# Patient Record
Sex: FEMALE | Race: WHITE | NOT HISPANIC OR LATINO | Employment: OTHER | URBAN - METROPOLITAN AREA
[De-identification: names, ages, dates, MRNs, and addresses within clinical notes are randomized per-mention and may not be internally consistent; named-entity substitution may affect disease eponyms.]

---

## 2017-01-09 ENCOUNTER — ANTI-COAG VISIT (OUTPATIENT)
Dept: CARDIOLOGY | Facility: CLINIC | Age: 82
End: 2017-01-09
Payer: MEDICARE

## 2017-01-09 ENCOUNTER — OFFICE VISIT (OUTPATIENT)
Dept: PODIATRY | Facility: CLINIC | Age: 82
End: 2017-01-09
Payer: MEDICARE

## 2017-01-09 ENCOUNTER — CLINICAL SUPPORT (OUTPATIENT)
Dept: CARDIOLOGY | Facility: CLINIC | Age: 82
End: 2017-01-09
Payer: MEDICARE

## 2017-01-09 VITALS — WEIGHT: 146.81 LBS | HEIGHT: 62 IN | BODY MASS INDEX: 27.02 KG/M2

## 2017-01-09 DIAGNOSIS — I48.0 PAF (PAROXYSMAL ATRIAL FIBRILLATION): ICD-10-CM

## 2017-01-09 DIAGNOSIS — Z95.818 STATUS POST PLACEMENT OF IMPLANTABLE LOOP RECORDER: ICD-10-CM

## 2017-01-09 DIAGNOSIS — I63.9 CEREBROVASCULAR ACCIDENT (CVA), UNSPECIFIED MECHANISM: ICD-10-CM

## 2017-01-09 DIAGNOSIS — G60.9 IDIOPATHIC PERIPHERAL NEUROPATHY: Primary | ICD-10-CM

## 2017-01-09 DIAGNOSIS — Z95.818 STATUS POST PLACEMENT OF IMPLANTABLE LOOP RECORDER: Primary | ICD-10-CM

## 2017-01-09 DIAGNOSIS — L97.521 ULCER OF TOE, LEFT, LIMITED TO BREAKDOWN OF SKIN: ICD-10-CM

## 2017-01-09 DIAGNOSIS — Z92.29 HX: LONG TERM ANTICOAGULANT USE: Primary | ICD-10-CM

## 2017-01-09 DIAGNOSIS — B35.1 ONYCHOMYCOSIS DUE TO DERMATOPHYTE: ICD-10-CM

## 2017-01-09 LAB
CTP QC/QA: NORMAL
INR PPP: 3.4 (ref 2–3)

## 2017-01-09 PROCEDURE — 1159F MED LIST DOCD IN RCRD: CPT | Mod: S$GLB,,, | Performed by: PODIATRIST

## 2017-01-09 PROCEDURE — 99999 PR PBB SHADOW E&M-EST. PATIENT-LVL II: CPT | Mod: PBBFAC,,, | Performed by: PODIATRIST

## 2017-01-09 PROCEDURE — 99214 OFFICE O/P EST MOD 30 MIN: CPT | Mod: 25,S$GLB,, | Performed by: PODIATRIST

## 2017-01-09 PROCEDURE — 1157F ADVNC CARE PLAN IN RCRD: CPT | Mod: S$GLB,,, | Performed by: PODIATRIST

## 2017-01-09 PROCEDURE — 93299 LOOP RECORDER REMOTE: CPT | Mod: S$GLB,,, | Performed by: INTERNAL MEDICINE

## 2017-01-09 PROCEDURE — 85610 PROTHROMBIN TIME: CPT | Mod: QW,S$GLB,, | Performed by: PHARMACIST

## 2017-01-09 PROCEDURE — 1160F RVW MEDS BY RX/DR IN RCRD: CPT | Mod: S$GLB,,, | Performed by: PODIATRIST

## 2017-01-09 PROCEDURE — 99211 OFF/OP EST MAY X REQ PHY/QHP: CPT | Mod: 25,S$GLB,, | Performed by: PHARMACIST

## 2017-01-09 PROCEDURE — 1125F AMNT PAIN NOTED PAIN PRSNT: CPT | Mod: S$GLB,,, | Performed by: PODIATRIST

## 2017-01-09 PROCEDURE — 99499 UNLISTED E&M SERVICE: CPT | Mod: S$GLB,,, | Performed by: PODIATRIST

## 2017-01-09 PROCEDURE — 93297 REM INTERROG DEV EVAL ICPMS: CPT | Mod: S$GLB,,, | Performed by: INTERNAL MEDICINE

## 2017-01-09 PROCEDURE — 11721 DEBRIDE NAIL 6 OR MORE: CPT | Mod: Q9,S$GLB,, | Performed by: PODIATRIST

## 2017-01-09 NOTE — PROGRESS NOTES
INr above range.  Pt reports she has had cabbage last weekend as well as this weekend. Also had small drink this weekend.  She states very little ETOH in this.  She is unsure she will proceed with the carpal tunnel sx scheduled in feb.  She will keep us informed about this.  Lower dose at this time and recheck in 2 weeks

## 2017-01-09 NOTE — PROGRESS NOTES
Subjective:      Patient ID: Rody Avalos is a 87 y.o. female.    Chief Complaint: Foot Pain (Bilateral foot pain PCP Jimenez 12/5/16)    Rody Toledo is a 87 y.o. female who presents to the clinic for evaluation and treatment of high risk feet. 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 This patient has documented high risk feet requiring routine maintenance secondary to peripheral neuropathy.  The patient's chief complaint concerns the painful callus of the Lt. 2nd toe.  States the lesion has significantly thickened in between visits.  States the lesion emits sharp pain that she rates as a 4/10.  States symptoms are exacerbated with pressure to the toe with wearing shoe gear and alleviated with shoe removal.  Has attempted to self treat by wearing a foam toe sleeve, however, reports not being able to keep the sleeve in place.  Denies N/V/F/C/D.  Denies any additional pedal complaints.      PCP: Cameron Ganoa MD    Date Last Seen by PCP: 12/5/16    Current shoe gear:  Casual shoe gear    Last encounter in this department: 9/26/2016    Hemoglobin A1C   Date Value Ref Range Status   10/11/2006 5.5 4.5 - 6.2 % Final   06/26/2006 5.6 4.5 - 6.2 % Final       Past Medical History   Diagnosis Date    *Atrial fibrillation     After-cataract obscuring vision 1/20/2012    Arthritis     Bunion     CAD (coronary artery disease)     Callus     CKD (chronic kidney disease) stage 3, GFR 30-59 ml/min 5/21/2014    Corns and callosities     Diverticulitis     Hyperlipemia     Hypertension     IBS (irritable bowel syndrome)     Rotator cuff injury      torn right rotator cuff    Stroke         Past Surgical History   Procedure Laterality Date    Coronary angioplasty with stent placement  1998 and 1/2011    Cataract extraction       ou    Heart stents      Colon surgery       diverticulitis    Hysterectomy      Cholecystectomy         Family History   Problem Relation Age of Onset    Cancer Father      lung    Heart disease Father     Aneurysm Mother     Lung cancer Sister     Lung cancer Brother     Amblyopia Neg Hx     Blindness Neg Hx     Cataracts Neg Hx     Diabetes Neg Hx     Glaucoma Neg Hx     Hypertension Neg Hx     Macular degeneration Neg Hx     Retinal detachment Neg Hx     Strabismus Neg Hx     Stroke Neg Hx     Thyroid disease Neg Hx        Social History     Social History    Marital status:      Spouse name: N/A    Number of children: N/A    Years of education: N/A     Occupational History    retired;  x 43 years      Social History Main Topics    Smoking status: Former Smoker    Smokeless tobacco: Never Used    Alcohol use Yes      Comment: rare    Drug use: No    Sexual activity: No     Other Topics Concern    None     Social History Narrative       Current Outpatient Prescriptions   Medication Sig Dispense Refill    alprazolam (XANAX) 0.25 MG tablet TAKE ONE TABLET BY MOUTH TWICE A DAY AS NEEDED FOR ANXIETY 45 tablet 2    atorvastatin (LIPITOR) 10 MG tablet TAKE ONE TABLET BY MOUTH DAILY 90 tablet 2    b complex vitamins capsule Take 1 capsule by mouth once daily.      CALCIUM CARBONATE/VITAMIN D3 (VITAMIN D-3 ORAL) Take by mouth once daily.      cyanocobalamin (VITAMIN B-12) 1000 MCG tablet Every day      furosemide (LASIX) 20 MG tablet TAKE ONE TABLET BY MOUTH ONCE DAILY 30 tablet 3    JANTOVEN 5 mg tablet TAKE ONE TABLET BY MOUTH IN THE EVENING (Patient taking differently: sunday, thursday take 5mg. remaining days takes 2.5mg. Goes to coumadin clinic every 6 weeks. ) 90 tablet 4    losartan (COZAAR) 100 MG tablet  TAKE ONE TABLET BY MOUTH DAILY 90 tablet 1    metoprolol succinate (TOPROL-XL) 100 MG 24 hr tablet TAKE ONE TABLET BY MOUTH DAILY (Patient taking differently: TAKE ONE HALF TABLET BY MOUTH DAILY) 90 tablet 3    MULTAQ 400 mg Tab TAKE 1 TABLET (400 MG TOTAL)  BY MOUTH 2 (TWO) TIMES DAILY. 60 tablet 10    multivitamin capsule Take 1 capsule by mouth once daily.      omeprazole (PRILOSEC) 20 MG capsule Take 20 mg by mouth once daily.        potassium chloride (MICRO-K) 10 MEQ CpSR Take 1 capsule (10 mEq total) by mouth every Mon, Wed, Fri. 20 capsule 5     No current facility-administered medications for this visit.        Review of patient's allergies indicates:   Allergen Reactions    Ciprofloxacin Anaphylaxis    Iodine Anaphylaxis and Edema    Metronidazole hcl Anaphylaxis    Amoxicillin Hives    Epinephrine      Palpitations/Tachycardia    Lidocaine      Tachycardia    Penicillins     Sulfa (sulfonamide antibiotics)          Review of Systems   Constitution: Negative for chills, decreased appetite, diaphoresis and fever.   Cardiovascular: Negative for claudication and leg swelling.   Skin: Positive for color change, dry skin and nail changes.   Musculoskeletal: Positive for arthritis and joint pain. Negative for back pain and falls.   Neurological: Positive for numbness and paresthesias.   Psychiatric/Behavioral: Negative for altered mental status.           Objective:      Physical Exam   Constitutional: She is oriented to person, place, and time. She appears well-developed and well-nourished. No distress.   Cardiovascular:   Pulses:       Dorsalis pedis pulses are 2+ on the right side, and 2+ on the left side.        Posterior tibial pulses are 1+ on the right side, and 1+ on the left side.   CFT <3 seconds bilateral.  Pedal hair growth decreased bilateral.  Varicosities noted to bilateral lower extremity.  1+ pitting edema noted to bilateral lower extremity.  Toes are cool to touch bilateral.      Musculoskeletal: She exhibits edema and tenderness.   Muscle strength 5/5 in all muscle groups bilateral.  No tenderness nor crepitation with ROM of foot/ankle joints bilateral.  Pain with palpation of the lesion of the Lt. 2nd toe.  Bilateral pes cavus foot type.  Bilateral hallux abducto valgus.  Bilateral semi-rigid contracture of toes 2-5 with overlapping of the Lt.  2nd toe on the 1st.     Neurological: She is alert and oriented to person, place, and time. She has normal strength. A sensory deficit is present.   Protective sensation per Kimball-Clemente monofilament decreased bilateral.    Vibratory sensation decreased bilateral.    Light touch intact bilateral.   Skin: Skin is warm and dry. Lesion and petechiae noted. No abrasion, no bruising, no burn, no ecchymosis, no laceration and no rash noted. She is not diaphoretic. No cyanosis or erythema. No pallor. Nails show no clubbing.   Pedal skin is thin and atrophic bilateral.  Toenails x 10 appear thickened by 2 mm's, elongated by 2 mm's, and discolored with subungual debris.  Focal hyperkeratotic lesion noted to the distal tip of the Lt. 2nd toe with an underlying wound.  Wound base is comprised entirely of granulation tissue.  Josefa wound is devoid of erythema, edema, fluctuance, purulence, and malodor.  Measures 0.1 x 0.7 x 0.1cm.              Assessment:       Encounter Diagnoses   Name Primary?    Idiopathic peripheral neuropathy Yes    Onychomycosis due to dermatophyte     Ulcer of toe, left, limited to breakdown of skin          Plan:       Rody Toledo was seen today for foot pain.    Diagnoses and all orders for this visit:    Idiopathic peripheral neuropathy    Onychomycosis due to dermatophyte    Ulcer of toe, left, limited to breakdown of skin      I counseled the patient on her conditions, their implications and medical management.    - With the patient's verbal permission, a sterile #15 scalpel was used to trim the lesion of the Lt. 2nd toe  exposing an underlying ulceration.  No debridement of the ulceration was performed as the wound base is granular.     - Mariposa was applied to the wound bed, and a football dressing was applied.    - Instructed to keep today's dressing clean, dry, and intact x 1 week.    - Fitted and dispensed a postoperative shoe to facilitate wound healing.    - Advised to rest and elevate the affected extremity.    - With patient's permission, nails were aggressively reduced and debrided x 10 to their soft tissue attachment mechanically and with electric , removing all offending nail and debris. Patient relates relief following the procedure. She will continue to monitor the areas daily, inspect her feet, wear protective shoe gear when ambulatory, moisturizer to maintain skin integrity and follow in this office in approximately 2-3 months, sooner p.r.n.      Return in about 1 week (around 1/16/2017).    Stan Guan DPM

## 2017-01-09 NOTE — MR AVS SNAPSHOT
[facility]  [address] [facility] [address]  Phone: [phone]                  Rody Avalos   2017 1:00 PM   Office Visit    Description:  Female : 1930   Provider:  Stan Guan DPM   Department:  [facility]           Reason for Visit     Foot Pain           Diagnoses this Visit        Comments    Idiopathic peripheral neuropathy    -  Primary     Pre-ulcerative calluses         Onychomycosis due to dermatophyte                To Do List           Future Appointments        Provider Department Dept Phone    2017 1:45 PM Joi Sifuentes, PharmD [facility] [phone]    2017 11:40 AM Stan Guan DPM [facility] [phone]    2017 9:00 AM PACEMAKER [facility] [phone]    2017 9:30 AM Aldo Alvarado MD [facility] [phone]      Your Future Surgeries/Procedures     Feb 10, 2017   Surgery with Julian Riley MD   [facility]    [address]  [facility] [id number] [phone]              Goals (5 Years of Data)     None      Follow-Up and Disposition     Return in about 3 months (around 2017).      [facility]     [facility] Care [facility] - 24/7 Assistance  Registered nurses in the [facility] provide clinical advisement, health education, appointment booking, and other advisory services.  Call for this free service at [phone].             Medications           Message regarding Medications     Verify the changes and/or additions to your medication regime listed below are the same as discussed with your clinician today.  If any of these changes or additions are incorrect, please notify your healthcare provider.             Verify that the below list of medications is an accurate representation of the medications you are currently taking.  If none reported, the list may be blank. If incorrect, please  "contact your healthcare provider. Carry this list with you in case of emergency.           Current Medications     alprazolam (XANAX) 0.25 MG tablet TAKE ONE TABLET BY MOUTH TWICE A DAY AS NEEDED FOR ANXIETY    atorvastatin (LIPITOR) 10 MG tablet TAKE ONE TABLET BY MOUTH DAILY    b complex vitamins capsule Take 1 capsule by mouth once daily.    CALCIUM CARBONATE/VITAMIN D3 (VITAMIN D-3 ORAL) Take by mouth once daily.    cyanocobalamin (VITAMIN B-12) 1000 MCG tablet Every day    furosemide (LASIX) 20 MG tablet TAKE ONE TABLET BY MOUTH ONCE DAILY    JANTOVEN 5 mg tablet TAKE ONE TABLET BY MOUTH IN THE EVENING    losartan (COZAAR) 100 MG tablet TAKE ONE TABLET BY MOUTH DAILY    metoprolol succinate (TOPROL-XL) 100 MG 24 hr tablet TAKE ONE TABLET BY MOUTH DAILY    MULTAQ 400 mg Tab TAKE 1 TABLET (400 MG TOTAL)  BY MOUTH 2 (TWO) TIMES DAILY.    multivitamin capsule Take 1 capsule by mouth once daily.    omeprazole (PRILOSEC) 20 MG capsule Take 20 mg by mouth once daily.      potassium chloride (MICRO-K) 10 MEQ CpSR Take 1 capsule (10 mEq total) by mouth every Mon, Wed, Fri.           Clinical Reference Information           Vital Signs - Last Recorded  Most recent update: 1/9/2017  1:04 PM by Clarissa Lassiter LPN    Ht Wt BMI          5' 2" (1.575 m) 66.6 kg (146 lb 13.2 oz) 26.85 kg/m2        Allergies as of 1/9/2017     Ciprofloxacin    Iodine    Metronidazole Hcl    Amoxicillin    Epinephrine    Lidocaine    Penicillins    Sulfa (Sulfonamide Antibiotics)      Immunizations Administered on Date of Encounter - 1/9/2017     None      "

## 2017-01-09 NOTE — MR AVS SNAPSHOT
[facility]  [address]  [place] [address]  Phone: [phone]                  Rody Avalos   2017 1:45 PM   Anti-coag visit    Description:  Female : 1930   Provider:  Joi Sifuentes, Nico   Department:  [place] [facility]           Diagnoses this Visit        Comments    HX: long term anticoagulant use    -  Primary     Cerebrovascular accident (CVA), unspecified mechanism         PAF (paroxysmal atrial fibrillation)                To Do List           Future Appointments        Provider Department Dept Phone    2017 11:40 AM Stan Guan DPM [place] [facility] [phone]    2017 1:45 PM Jaydon FieldsD [place] [facility] [phone]    2017 9:00 AM PACEMAKER [facility] [phone]    2017 9:30 AM Aldo Alvarado MD [facility] [phone]      Your Future Surgeries/Procedures     Feb 10, 2017   Surgery with Julian Riley MD   [facility]    [address]  [place] [id number] [phone]              Goals (5 Years of Data)     None      [facility]     [facility] Care [facility] - 24/7 Assistance  Registered nurses in the [facility] provide clinical advisement, health education, appointment booking, and other advisory services.  Call for this free service at [phone].             Medications           Message regarding Medications     Verify the changes and/or additions to your medication regime listed below are the same as discussed with your clinician today.  If any of these changes or additions are incorrect, please notify your healthcare provider.             Verify that the below list of medications is an accurate representation of the medications you are currently taking.  If none reported, the list may be blank. If incorrect, please contact your healthcare provider. Carry this list with you in case of  emergency.           Current Medications     alprazolam (XANAX) 0.25 MG tablet TAKE ONE TABLET BY MOUTH TWICE A DAY AS NEEDED FOR ANXIETY    atorvastatin (LIPITOR) 10 MG tablet TAKE ONE TABLET BY MOUTH DAILY    b complex vitamins capsule Take 1 capsule by mouth once daily.    CALCIUM CARBONATE/VITAMIN D3 (VITAMIN D-3 ORAL) Take by mouth once daily.    cyanocobalamin (VITAMIN B-12) 1000 MCG tablet Every day    furosemide (LASIX) 20 MG tablet TAKE ONE TABLET BY MOUTH ONCE DAILY    JANTOVEN 5 mg tablet TAKE ONE TABLET BY MOUTH IN THE EVENING    losartan (COZAAR) 100 MG tablet TAKE ONE TABLET BY MOUTH DAILY    metoprolol succinate (TOPROL-XL) 100 MG 24 hr tablet TAKE ONE TABLET BY MOUTH DAILY    MULTAQ 400 mg Tab TAKE 1 TABLET (400 MG TOTAL)  BY MOUTH 2 (TWO) TIMES DAILY.    multivitamin capsule Take 1 capsule by mouth once daily.    omeprazole (PRILOSEC) 20 MG capsule Take 20 mg by mouth once daily.      potassium chloride (MICRO-K) 10 MEQ CpSR Take 1 capsule (10 mEq total) by mouth every Mon, Wed, Fri.           Clinical Reference Information           Allergies as of 1/9/2017     Ciprofloxacin    Iodine    Metronidazole Hcl    Amoxicillin    Epinephrine    Lidocaine    Penicillins    Sulfa (Sulfonamide Antibiotics)      Immunizations Administered on Date of Encounter - 1/9/2017     None      Orders Placed During Today's Visit      Normal Orders This Visit    POCT PT/INR          1/9/2017  2:04 PM - Joi Sifuentes, PharmD      Component Results     Component    INR    3.4     Acceptable      December 2016 Details    Sun Mon Tue Wed Thu Fri Sat         1               2               3                 4               5               6               7               8               9               10      2.5 mg           11      2.5 mg         12      5 mg         13      2.5 mg         14      2.5 mg         15   3.1   Hold   See details      16      2.5 mg         17      2.5 mg           18      2.5  mg         19      5 mg         20      2.5 mg         21      2.5 mg         22      2.5 mg         23      2.5 mg         24      2.5 mg           25      2.5 mg         26      5 mg         27      2.5 mg         28      2.5 mg         29      2.5 mg         30      2.5 mg         31      2.5 mg          Date Details   12/15 Last INR check   INR: 3.1                 January 2017 Details    Sun Mon Tue Wed Thu Fri Sat     1      2.5 mg         2      5 mg         3      2.5 mg         4      2.5 mg         5      2.5 mg         6      2.5 mg         7      2.5 mg           8      2.5 mg         9   3.4   2.5 mg   See details      10      2.5 mg         11      2.5 mg         12      2.5 mg         13      2.5 mg         14      2.5 mg           15      2.5 mg         16      2.5 mg         17      2.5 mg         18      2.5 mg         19      2.5 mg         20      2.5 mg         21      2.5 mg           22      2.5 mg         23      2.5 mg         24      2.5 mg         25      2.5 mg         26            27               28                 29               30               31                    Date Details   01/09 This INR check   INR: 3.4       Date of next INR:  1/26/2017               How to take your warfarin dose     To take:  2.5 mg Take 0.5 of a 5 mg tablet.           Anticoagulation Summary as of 1/9/2017     Maintenance plan 2.5 mg (5 mg x 0.5) every day    Full instructions 2.5 mg every day    Next INR check 1/26/2017      Anticoagulation Episode Summary     Comments [facility] CVA 10/20/13--h/o CHF 1-130 appt 4/15 no DDI with doxy pt has Jantoven in the home      Patient Findings     Positives Change in alcohol use, Change in diet/appetite    Negatives Signs/symptoms of thrombosis, Signs/symptoms of bleeding, Laboratory test error suspected, Change in health, Change in activity, Upcoming invasive procedure, Emergency department visit, Upcoming dental procedure, Missed doses, Extra doses, Change in  medications, Hospital admission, Bruising, Other complaints    Comments Pt reports she has had cabbage last weekend as well as this weekend. Also had small drink this weekend.  She sates very little ETOH in this

## 2017-01-16 ENCOUNTER — OFFICE VISIT (OUTPATIENT)
Dept: PODIATRY | Facility: CLINIC | Age: 82
End: 2017-01-16
Payer: MEDICARE

## 2017-01-16 VITALS — WEIGHT: 146.81 LBS | HEIGHT: 62 IN | BODY MASS INDEX: 27.02 KG/M2

## 2017-01-16 DIAGNOSIS — Z87.2 HEALED ULCER OF LEFT FOOT ON EXAMINATION: Primary | ICD-10-CM

## 2017-01-16 DIAGNOSIS — G60.9 IDIOPATHIC PERIPHERAL NEUROPATHY: ICD-10-CM

## 2017-01-16 PROCEDURE — 99499 UNLISTED E&M SERVICE: CPT | Mod: S$GLB,,, | Performed by: PODIATRIST

## 2017-01-16 PROCEDURE — 1160F RVW MEDS BY RX/DR IN RCRD: CPT | Mod: S$GLB,,, | Performed by: PODIATRIST

## 2017-01-16 PROCEDURE — 1126F AMNT PAIN NOTED NONE PRSNT: CPT | Mod: S$GLB,,, | Performed by: PODIATRIST

## 2017-01-16 PROCEDURE — 1159F MED LIST DOCD IN RCRD: CPT | Mod: S$GLB,,, | Performed by: PODIATRIST

## 2017-01-16 PROCEDURE — 99999 PR PBB SHADOW E&M-EST. PATIENT-LVL II: CPT | Mod: PBBFAC,,, | Performed by: PODIATRIST

## 2017-01-16 PROCEDURE — 99213 OFFICE O/P EST LOW 20 MIN: CPT | Mod: S$GLB,,, | Performed by: PODIATRIST

## 2017-01-16 PROCEDURE — 1157F ADVNC CARE PLAN IN RCRD: CPT | Mod: S$GLB,,, | Performed by: PODIATRIST

## 2017-01-16 NOTE — MR AVS SNAPSHOT
[facility]  [address] [facility] [address]  Phone: [phone]                  Rody Avalos   2017 11:40 AM   Office Visit    Description:  Female : 1930   Provider:  Stan Guan DPM   Department:  [facility]           Reason for Visit     Wound Care                To Do List           Future Appointments        Provider Department Dept Phone    2017 1:45 PM Joi Sifuentes, Nico [facility] [phone]    3/8/2017 1:00 PM Stan Guan DPM [facility] [phone]    2017 9:00 AM PACEMAKER [facility] Card[facility] [phone]    2017 9:30 AM Aldo Alvarado MD [facility] [phone]      Your Future Surgeries/Procedures     Feb 10, 2017   Surgery with Julian Riley MD   [facility]    [address] [place] [address]   [phone]              Goals (5 Years of Data)     None      [facility]     [facility] Care [facility] -  Assistance  Registered nurses in the [facility] provide clinical advisement, health education, appointment booking, and other advisory services.  Call for this free service at [phone].             Medications           Message regarding Medications     Verify the changes and/or additions to your medication regime listed below are the same as discussed with your clinician today.  If any of these changes or additions are incorrect, please notify your healthcare provider.             Verify that the below list of medications is an accurate representation of the medications you are currently taking.  If none reported, the list may be blank. If incorrect, please contact your healthcare provider. Carry this list with you in case of emergency.           Current Medications     alprazolam (XANAX) 0.25 MG tablet TAKE ONE TABLET BY MOUTH TWICE A DAY AS NEEDED FOR ANXIETY    atorvastatin (LIPITOR) 10 MG  "tablet TAKE ONE TABLET BY MOUTH DAILY    b complex vitamins capsule Take 1 capsule by mouth once daily.    CALCIUM CARBONATE/VITAMIN D3 (VITAMIN D-3 ORAL) Take by mouth once daily.    cyanocobalamin (VITAMIN B-12) 1000 MCG tablet Every day    furosemide (LASIX) 20 MG tablet TAKE ONE TABLET BY MOUTH ONCE DAILY    JANTOVEN 5 mg tablet TAKE ONE TABLET BY MOUTH IN THE EVENING    losartan (COZAAR) 100 MG tablet TAKE ONE TABLET BY MOUTH DAILY    metoprolol succinate (TOPROL-XL) 100 MG 24 hr tablet TAKE ONE TABLET BY MOUTH DAILY    MULTAQ 400 mg Tab TAKE 1 TABLET (400 MG TOTAL)  BY MOUTH 2 (TWO) TIMES DAILY.    multivitamin capsule Take 1 capsule by mouth once daily.    omeprazole (PRILOSEC) 20 MG capsule Take 20 mg by mouth once daily.      potassium chloride (MICRO-K) 10 MEQ CpSR Take 1 capsule (10 mEq total) by mouth every Mon, Wed, Fri.           Clinical Reference Information           Vital Signs - Last Recorded  Most recent update: 1/16/2017 11:45 AM by George Gleason MA     Wt BMI          5' 2" (1.575 m) 66.6 kg (146 lb 13.2 oz) 26.85 kg/m2        Allergies as of 1/16/2017     Ciprofloxacin    Iodine    Metronidazole Hcl    Amoxicillin    Epinephrine    Lidocaine    Penicillins    Sulfa (Sulfonamide Antibiotics)      Immunizations Administered on Date of Encounter - 1/16/2017     None      "

## 2017-01-26 ENCOUNTER — ANTI-COAG VISIT (OUTPATIENT)
Dept: CARDIOLOGY | Facility: CLINIC | Age: 82
End: 2017-01-26
Payer: MEDICARE

## 2017-01-26 DIAGNOSIS — Z79.01 LONG TERM (CURRENT) USE OF ANTICOAGULANTS: Primary | ICD-10-CM

## 2017-01-26 DIAGNOSIS — I48.0 PAF (PAROXYSMAL ATRIAL FIBRILLATION): ICD-10-CM

## 2017-01-26 DIAGNOSIS — I63.9 CEREBROVASCULAR ACCIDENT (CVA), UNSPECIFIED MECHANISM: ICD-10-CM

## 2017-01-26 LAB
CTP QC/QA: YES
INR PPP: 2.2 (ref 2–3)

## 2017-01-26 PROCEDURE — 85610 PROTHROMBIN TIME: CPT | Mod: QW,S$GLB,, | Performed by: PHARMACIST

## 2017-01-26 PROCEDURE — 99211 OFF/OP EST MAY X REQ PHY/QHP: CPT | Mod: 25,S$GLB,, | Performed by: PHARMACIST

## 2017-01-26 NOTE — MR AVS SNAPSHOT
[facility]  [address]  [place] [address]  Phone: [phone]                  Rody Avalos   2017 1:45 PM   Anti-coag visit    Description:  Female : 1930   Provider:  Joi Sifuentes, Nico   Department:  [place] [facility]           Diagnoses this Visit        Comments    Long term (current) use of anticoagulants    -  Primary     Cerebrovascular accident (CVA), unspecified mechanism         PAF (paroxysmal atrial fibrillation)                To Do List           Future Appointments        Provider Department Dept Phone    3/8/2017 1:00 PM Stan Guan DPM [place] [facility] Podiatry [phone]    3/8/2017 1:30 PM Joi Sifuentes, PharmD [place] [facility] Coumadin [phone]    2017 9:00 AM PACEMAKER [facility] Card[facility] [phone]    2017 9:30 AM Aldo Alvarado MD [facility] [phone]      Your Future Surgeries/Procedures     Feb 10, 2017   Surgery with Julian Riley MD   [facility]    [address]  [place] [id number] [phone]              Goals (5 Years of Data)     None      [facility]     [facility] Care [facility] - 24/7 Assistance  Registered nurses in the [facility] provide clinical advisement, health education, appointment booking, and other advisory services.  Call for this free service at [phone].             Medications           Message regarding Medications     Verify the changes and/or additions to your medication regime listed below are the same as discussed with your clinician today.  If any of these changes or additions are incorrect, please notify your healthcare provider.             Verify that the below list of medications is an accurate representation of the medications you are currently taking.  If none reported, the list may be blank. If incorrect, please contact your healthcare provider. Carry this list with you in case of  emergency.           Current Medications     alprazolam (XANAX) 0.25 MG tablet TAKE ONE TABLET BY MOUTH TWICE A DAY AS NEEDED FOR ANXIETY    atorvastatin (LIPITOR) 10 MG tablet TAKE ONE TABLET BY MOUTH DAILY    b complex vitamins capsule Take 1 capsule by mouth once daily.    CALCIUM CARBONATE/VITAMIN D3 (VITAMIN D-3 ORAL) Take by mouth once daily.    cyanocobalamin (VITAMIN B-12) 1000 MCG tablet Every day    furosemide (LASIX) 20 MG tablet TAKE ONE TABLET BY MOUTH ONCE DAILY    JANTOVEN 5 mg tablet TAKE ONE TABLET BY MOUTH IN THE EVENING    losartan (COZAAR) 100 MG tablet TAKE ONE TABLET BY MOUTH DAILY    metoprolol succinate (TOPROL-XL) 100 MG 24 hr tablet TAKE ONE TABLET BY MOUTH DAILY    MULTAQ 400 mg Tab TAKE 1 TABLET (400 MG TOTAL)  BY MOUTH 2 (TWO) TIMES DAILY.    multivitamin capsule Take 1 capsule by mouth once daily.    omeprazole (PRILOSEC) 20 MG capsule Take 20 mg by mouth once daily.      potassium chloride (MICRO-K) 10 MEQ CpSR Take 1 capsule (10 mEq total) by mouth every Mon, Wed, Fri.           Clinical Reference Information           Allergies as of 1/26/2017     Ciprofloxacin    Iodine    Metronidazole Hcl    Amoxicillin    Epinephrine    Lidocaine    Penicillins    Sulfa (Sulfonamide Antibiotics)      Immunizations Administered on Date of Encounter - 1/26/2017     None      Orders Placed During Today's Visit      Normal Orders This Visit    POCT INR          1/26/2017  1:47 PM - Joi Sifuentes, PharmD      Component Results     Component    INR    2.2     Acceptable    Yes      December 2016 Details    Sun Mon Tue Wed Thu Fri Sat         1               2               3                 4               5               6               7               8               9               10                 11               12               13               14               15               16               17                 18               19               20               21                22               23               24                 25               26               27      2.5 mg         28      2.5 mg         29      2.5 mg         30      2.5 mg         31      2.5 mg          Date Details   No additional details              January 2017 Details    Sun Mon Tue Wed Thu Fri Sat     1      2.5 mg         2      5 mg         3      2.5 mg         4      2.5 mg         5      2.5 mg         6      2.5 mg         7      2.5 mg           8      2.5 mg         9   3.4   2.5 mg   See details      10      2.5 mg         11      2.5 mg         12      2.5 mg         13      2.5 mg         14      2.5 mg           15      2.5 mg         16      2.5 mg         17      2.5 mg         18      2.5 mg         19      2.5 mg         20      2.5 mg         21      2.5 mg           22      2.5 mg         23      2.5 mg         24      2.5 mg         25      2.5 mg         26   2.2   2.5 mg   See details      27      2.5 mg         28      2.5 mg           29      2.5 mg         30      2.5 mg         31      2.5 mg              Date Details   01/09 Last INR check   INR: 3.4      01/26 This INR check   INR: 2.2                     How to take your warfarin dose     To take:  2.5 mg Take 0.5 of a 5 mg tablet.           February 2017 Details    Sun Mon Tue Wed Thu Fri Sat        1      2.5 mg         2      2.5 mg         3      2.5 mg         4      2.5 mg           5      2.5 mg         6      2.5 mg         7      2.5 mg         8      2.5 mg         9      2.5 mg         10      2.5 mg         11      2.5 mg           12      2.5 mg         13      2.5 mg         14      2.5 mg         15      2.5 mg         16      2.5 mg         17      2.5 mg         18      2.5 mg           19      2.5 mg         20      2.5 mg         21      2.5 mg         22      2.5 mg         23      2.5 mg         24      2.5 mg         25      2.5 mg           26      2.5 mg         27      2.5 mg         28      2.5 mg               Date Details   No additional details            How to take your warfarin dose     To take:  2.5 mg Take 0.5 of a 5 mg tablet.           March 2017 Details    Sun Mon Tue Wed Thu Fri Sat        1      2.5 mg         2      2.5 mg         3      2.5 mg         4      2.5 mg           5      2.5 mg         6      2.5 mg         7      2.5 mg         8            9               10               11                 12               13               14               15               16               17               18                 19               20               21               22               23               24               25                 26               27               28               29               30               31                 Date Details   No additional details    Date of next INR:  3/8/2017         How to take your warfarin dose     To take:  2.5 mg Take 0.5 of a 5 mg tablet.           Anticoagulation Summary as of 1/26/2017     Maintenance plan 2.5 mg (5 mg x 0.5) every day    Full instructions 2.5 mg every day    Next INR check 3/8/2017      Anticoagulation Episode Summary     Comments [facility] CVA 10/20/13--h/o CHF 1-130 appt 4/15 no DDI with doxy pt has Jantoven in the home      Patient Findings     Negatives Signs/symptoms of thrombosis, Signs/symptoms of bleeding, Laboratory test error suspected, Change in health, Change in alcohol use, Change in activity, Upcoming invasive procedure, Emergency department visit, Upcoming dental procedure, Missed doses, Extra doses, Change in medications, Change in diet/appetite, Hospital admission, Bruising, Other complaints

## 2017-02-06 ENCOUNTER — OFFICE VISIT (OUTPATIENT)
Dept: FAMILY MEDICINE | Facility: CLINIC | Age: 82
End: 2017-02-06
Payer: MEDICARE

## 2017-02-06 VITALS
HEART RATE: 62 BPM | SYSTOLIC BLOOD PRESSURE: 120 MMHG | DIASTOLIC BLOOD PRESSURE: 68 MMHG | WEIGHT: 141.63 LBS | HEIGHT: 62 IN | TEMPERATURE: 99 F | BODY MASS INDEX: 26.06 KG/M2

## 2017-02-06 DIAGNOSIS — Z86.73 HISTORY OF CVA (CEREBROVASCULAR ACCIDENT): ICD-10-CM

## 2017-02-06 DIAGNOSIS — R51.9 HEADACHE, UNSPECIFIED HEADACHE TYPE: ICD-10-CM

## 2017-02-06 DIAGNOSIS — R06.09 DYSPNEA ON EXERTION: ICD-10-CM

## 2017-02-06 DIAGNOSIS — N18.30 CKD (CHRONIC KIDNEY DISEASE) STAGE 3, GFR 30-59 ML/MIN: ICD-10-CM

## 2017-02-06 DIAGNOSIS — I27.20 PULMONARY HYPERTENSION: Primary | ICD-10-CM

## 2017-02-06 DIAGNOSIS — I25.10 CORONARY ARTERY DISEASE, ANGINA PRESENCE UNSPECIFIED, UNSPECIFIED VESSEL OR LESION TYPE, UNSPECIFIED WHETHER NATIVE OR TRANSPLANTED HEART: ICD-10-CM

## 2017-02-06 PROCEDURE — 99499 UNLISTED E&M SERVICE: CPT | Mod: S$GLB,,, | Performed by: FAMILY MEDICINE

## 2017-02-06 PROCEDURE — 99999 PR PBB SHADOW E&M-EST. PATIENT-LVL III: CPT | Mod: PBBFAC,,, | Performed by: FAMILY MEDICINE

## 2017-02-06 PROCEDURE — 1157F ADVNC CARE PLAN IN RCRD: CPT | Mod: S$GLB,,, | Performed by: FAMILY MEDICINE

## 2017-02-06 PROCEDURE — 1159F MED LIST DOCD IN RCRD: CPT | Mod: S$GLB,,, | Performed by: FAMILY MEDICINE

## 2017-02-06 PROCEDURE — 99214 OFFICE O/P EST MOD 30 MIN: CPT | Mod: S$GLB,,, | Performed by: FAMILY MEDICINE

## 2017-02-06 PROCEDURE — 1126F AMNT PAIN NOTED NONE PRSNT: CPT | Mod: S$GLB,,, | Performed by: FAMILY MEDICINE

## 2017-02-06 PROCEDURE — 1160F RVW MEDS BY RX/DR IN RCRD: CPT | Mod: S$GLB,,, | Performed by: FAMILY MEDICINE

## 2017-02-06 NOTE — MR AVS SNAPSHOT
[facility]  [address]  [place] [address]  Phone: [phone]  Fax: [phone]                  Rody Avalos   2017 11:15 AM   Office Visit    Description:  Female : 1930   Provider:  Cameron Gaona MD   Department:  [facility]           Reason for Visit     head pain           Diagnoses this Visit        Comments    Pulmonary hypertension    -  Primary     CKD (chronic kidney disease) stage 3, GFR 30-59 ml/min         Coronary artery disease, angina presence unspecified, unspecified vessel or lesion type, unspecified whether native or transplanted heart         History of CVA (cerebrovascular accident)         Headache, unspecified headache type         Dyspnea on exertion                To Do List           Future Appointments        Provider Department Dept Phone    3/8/2017 12:45 PM LAB, [place] [facility] [phone]    3/8/2017 1:00 PM Stan Guan DPM [facility] Podiatry [phone]    3/8/2017 1:30 PM Joi Sifuentes, PharmD [place] [facility] [phone]    2017 9:00 AM PACEMAKER [facility] Cardiology [phone]    2017 9:30 AM Aldo Alvarado MD [facility] Cardiology [phone]      Goals (5 Years of Data)     None      [facility] On Call     [facility] Care [facility] -  Assistance  Registered nurses in the [facility] provide clinical advisement, health education, appointment booking, and other advisory services.  Call for this free service at [phone].             Medications           Message regarding Medications     Verify the changes and/or additions to your medication regime listed below are the same as discussed with your clinician today.  If any of these changes or additions are incorrect, please notify your healthcare provider.             Verify that the below list of medications is an accurate representation of the medications you are  "currently taking.  If none reported, the list may be blank. If incorrect, please contact your healthcare provider. Carry this list with you in case of emergency.           Current Medications     alprazolam (XANAX) 0.25 MG tablet TAKE ONE TABLET BY MOUTH TWICE A DAY AS NEEDED FOR ANXIETY    atorvastatin (LIPITOR) 10 MG tablet TAKE ONE TABLET BY MOUTH DAILY    b complex vitamins capsule Take 1 capsule by mouth once daily.    CALCIUM CARBONATE/VITAMIN D3 (VITAMIN D-3 ORAL) Take by mouth once daily.    cyanocobalamin (VITAMIN B-12) 1000 MCG tablet Every day    furosemide (LASIX) 20 MG tablet TAKE ONE TABLET BY MOUTH ONCE DAILY    JANTOVEN 5 mg tablet TAKE ONE TABLET BY MOUTH IN THE EVENING    losartan (COZAAR) 100 MG tablet TAKE ONE TABLET BY MOUTH DAILY    metoprolol succinate (TOPROL-XL) 100 MG 24 hr tablet TAKE ONE TABLET BY MOUTH DAILY    MULTAQ 400 mg Tab TAKE 1 TABLET (400 MG TOTAL)  BY MOUTH 2 (TWO) TIMES DAILY.    multivitamin capsule Take 1 capsule by mouth once daily.    omeprazole (PRILOSEC) 20 MG capsule Take 20 mg by mouth once daily.      potassium chloride (MICRO-K) 10 MEQ CpSR Take 1 capsule (10 mEq total) by mouth every Mon, Wed, Fri.           Clinical Reference Information           Your Vitals Were     BP Pulse Temp Height Weight BMI    120/68 (BP Location: Right arm) 62 99 °F (37.2 °C) (Oral) 5' 2" (1.575 m) 64.3 kg (141 lb 10.3 oz) 25.91 kg/m2      Blood Pressure          Most Recent Value    BP  120/68      Allergies as of 2/6/2017     Ciprofloxacin    Iodine    Metronidazole Hcl    Amoxicillin    Epinephrine    Lidocaine    Penicillins    Sulfa (Sulfonamide Antibiotics)      Immunizations Administered on Date of Encounter - 2/6/2017     None      Orders Placed During Today's Visit     Future Labs/Procedures Expected by Expires    Basic metabolic panel  2/6/2017 2/7/2018    Sedimentation rate, manual  2/6/2017 2/7/2018    Complete PFT w/ bronchodilator  As directed 2/6/2018      Instructions  "   Call [phone] to schedule pulmonary function test at [facility]. Order is in.       Language Assistance Services     ATTENTION: Language assistance services are available, free of charge. Please call [phone].      ATENCIÓN: Si habla roseline, tiene a mcelroy disposición servicios gratuitos de asistencia lingüística. Llame al [phone].     CHÚ Ý: N?u b?n nói Ti?ng Vi?t, có các d?ch v? h? tr? ngôn ng? mi?n phí dành cho b?n. G?i s? [phone].         [facility] complies with applicable Federal civil rights laws and does not discriminate on the basis of race, color, national origin, age, disability, or sex.

## 2017-02-06 NOTE — PROGRESS NOTES
"Subjective:       Patient ID: Rody Avalos is a 87 y.o. female.    Chief Complaint: head pain (Shooting pain in head and ears, not HA. Started 1 wk ago. "Felt like the pain with shingles." started 1 wk ago, lasted about 4d.)    HPI Comments: A week ago she started with a headache that started with sharp pain in her left ear.  It progressed to a vertical headache with some pain in the right ear.  She was taking Tylenol every 4 hours with some amount of stomach upset.  The headache gradually resolved.  She has had some recent postnasal drip and rhinorrhea.  There were no visual defects.  The scalp was sensitive to touch.  No numbness or weakness.  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  She also has a history of mitral regurgitation and pulmonary hypertension.  She was started on Lasix but that has not helped her dyspnea on exertion.  She had a previous chest x-ray which showed a little bit of pulmonary congestion but that improved.  There is some pulmonary scarring.  She has never had pulmonary function testing.    Review of Systems   Constitutional: Negative for fever and unexpected weight change.   Respiratory: Positive for shortness of breath. Negative for cough.    Cardiovascular: Negative for chest pain, palpitations and leg swelling.       Objective:     Blood pressure 120/68, pulse 62, temperature 99 °F (37.2 °C), temperature source Oral, height 5' 2" (1.575 m), weight 64.3 kg (141 lb 10.3 oz).      Physical Exam   HENT:   Temples are nontender.  Sinuses are nontender  Mild TMJ crepitance and tenderness.  Auricles are nontender and ear canals are normal.   Eyes: Pupils are equal, round, and reactive to light.   Limited view of her retina.   Neck: Normal range of motion. Neck supple. No thyromegaly present.   Cardiovascular: Normal rate and regular rhythm.    Murmur heard.  Pulmonary/Chest: Effort normal.   She has some dry basilar crackles.  No wheezes.   Lymphadenopathy:     " She has no cervical adenopathy.   Neurological: She is alert.   EOM intact.  Visual fields intact to confrontation.       Assessment:       1. Pulmonary hypertension    2. CKD (chronic kidney disease) stage 3, GFR 30-59 ml/min    3. Coronary artery disease, angina presence unspecified, unspecified vessel or lesion type, unspecified whether native or transplanted heart    4. History of CVA (cerebrovascular accident)    5. Headache, unspecified headache type    6. Dyspnea on exertion        Plan:       We will monitor her headache for recurrence.  ESR to rule out temporal arteritis.  Lab work ordered for follow-up of CKD.  Pulmonary function testing for dyspnea on exertion

## 2017-02-06 NOTE — PATIENT INSTRUCTIONS
Call [phone] to schedule pulmonary function test at [facility]. Order is in.

## 2017-03-07 ENCOUNTER — TELEPHONE (OUTPATIENT)
Dept: CARDIOLOGY | Facility: CLINIC | Age: 82
End: 2017-03-07

## 2017-03-07 NOTE — TELEPHONE ENCOUNTER
----- Message from Mirna Hoang sent at 3/7/2017  9:28 AM CST -----  Pt has an appointment on 04/27 th ... Recall ... Please call to schedule test prior to appointment / [phone] (home)

## 2017-03-08 ENCOUNTER — OFFICE VISIT (OUTPATIENT)
Dept: PODIATRY | Facility: CLINIC | Age: 82
End: 2017-03-08
Payer: MEDICARE

## 2017-03-08 ENCOUNTER — LAB VISIT (OUTPATIENT)
Dept: LAB | Facility: HOSPITAL | Age: 82
End: 2017-03-08
Attending: FAMILY MEDICINE
Payer: MEDICARE

## 2017-03-08 ENCOUNTER — ANTI-COAG VISIT (OUTPATIENT)
Dept: CARDIOLOGY | Facility: CLINIC | Age: 82
End: 2017-03-08
Payer: MEDICARE

## 2017-03-08 VITALS — BODY MASS INDEX: 26.21 KG/M2 | HEIGHT: 62 IN | WEIGHT: 142.44 LBS

## 2017-03-08 DIAGNOSIS — N18.30 CKD (CHRONIC KIDNEY DISEASE) STAGE 3, GFR 30-59 ML/MIN: ICD-10-CM

## 2017-03-08 DIAGNOSIS — Z79.01 LONG TERM (CURRENT) USE OF ANTICOAGULANTS: Primary | ICD-10-CM

## 2017-03-08 DIAGNOSIS — I63.9 CEREBROVASCULAR ACCIDENT (CVA), UNSPECIFIED MECHANISM: ICD-10-CM

## 2017-03-08 DIAGNOSIS — G60.9 IDIOPATHIC PERIPHERAL NEUROPATHY: ICD-10-CM

## 2017-03-08 DIAGNOSIS — L03.032 CELLULITIS OF TOE OF LEFT FOOT: ICD-10-CM

## 2017-03-08 DIAGNOSIS — I48.0 PAF (PAROXYSMAL ATRIAL FIBRILLATION): ICD-10-CM

## 2017-03-08 DIAGNOSIS — L97.521 ULCER OF TOE, LEFT, LIMITED TO BREAKDOWN OF SKIN: Primary | ICD-10-CM

## 2017-03-08 DIAGNOSIS — R51.9 HEADACHE, UNSPECIFIED HEADACHE TYPE: ICD-10-CM

## 2017-03-08 LAB
ERYTHROCYTE [SEDIMENTATION RATE] IN BLOOD BY WESTERGREN METHOD: 3 MM/HR
INR PPP: 3.6 (ref 2–3)

## 2017-03-08 PROCEDURE — 1126F AMNT PAIN NOTED NONE PRSNT: CPT | Mod: S$GLB,,, | Performed by: PODIATRIST

## 2017-03-08 PROCEDURE — 1159F MED LIST DOCD IN RCRD: CPT | Mod: S$GLB,,, | Performed by: PODIATRIST

## 2017-03-08 PROCEDURE — 36415 COLL VENOUS BLD VENIPUNCTURE: CPT | Mod: PO

## 2017-03-08 PROCEDURE — 85610 PROTHROMBIN TIME: CPT | Mod: QW,S$GLB,, | Performed by: PHARMACIST

## 2017-03-08 PROCEDURE — 99999 PR PBB SHADOW E&M-EST. PATIENT-LVL III: CPT | Mod: PBBFAC,,, | Performed by: PODIATRIST

## 2017-03-08 PROCEDURE — 99499 UNLISTED E&M SERVICE: CPT | Mod: S$GLB,,, | Performed by: PODIATRIST

## 2017-03-08 PROCEDURE — 1160F RVW MEDS BY RX/DR IN RCRD: CPT | Mod: S$GLB,,, | Performed by: PODIATRIST

## 2017-03-08 PROCEDURE — 80048 BASIC METABOLIC PNL TOTAL CA: CPT

## 2017-03-08 PROCEDURE — 99211 OFF/OP EST MAY X REQ PHY/QHP: CPT | Mod: 25,S$GLB,, | Performed by: PHARMACIST

## 2017-03-08 PROCEDURE — 1157F ADVNC CARE PLAN IN RCRD: CPT | Mod: S$GLB,,, | Performed by: PODIATRIST

## 2017-03-08 PROCEDURE — 85651 RBC SED RATE NONAUTOMATED: CPT | Mod: PO

## 2017-03-08 PROCEDURE — 99214 OFFICE O/P EST MOD 30 MIN: CPT | Mod: S$GLB,,, | Performed by: PODIATRIST

## 2017-03-08 RX ORDER — CLINDAMYCIN HYDROCHLORIDE 300 MG/1
300 CAPSULE ORAL 3 TIMES DAILY
Qty: 30 CAPSULE | Refills: 0 | Status: SHIPPED | OUTPATIENT
Start: 2017-03-08 | End: 2017-03-18

## 2017-03-08 NOTE — MR AVS SNAPSHOT
[facility]  [address]  [facility] [address]  Phone: [phone]                  Rody Avalos   3/8/2017 1:30 PM   Anti-coag visit    Description:  Female : 1930   Provider:  Joi Sifuentes, PharmD   Department:  [place] - Coumadin           Diagnoses this Visit        Comments    Long term (current) use of anticoagulants    -  Primary     Cerebrovascular accident (CVA), unspecified mechanism         PAF (paroxysmal atrial fibrillation)                To Do List           Future Appointments        Provider Department Dept Phone    3/15/2017 1:20 PM Stan Guan DPM [facility] Podiatry [phone]    3/27/2017 1:45 PM Joi Sifuentes, PharmD [facility] Coumadin [phone]    4/3/2017 1:15 PM Adela Whitney MD [place] [address] - Ophthalmology [phone]    4/10/2017 12:45 PM NM1 [facility] [phone]    4/10/2017 2:00 PM STRESS, [facility] - Cardiology [phone]      Goals (5 Years of Data)     None      [facility] On Call     [facility] Nurse Care [facility] -  Assistance  Registered nurses in the [facility] provide clinical advisement, health education, appointment booking, and other advisory services.  Call for this free service at [phone].             Medications           Message regarding Medications     Verify the changes and/or additions to your medication regime listed below are the same as discussed with your clinician today.  If any of these changes or additions are incorrect, please notify your healthcare provider.             Verify that the below list of medications is an accurate representation of the medications you are currently taking.  If none reported, the list may be blank. If incorrect, please contact your healthcare provider. Carry this list with you in case of emergency.           Current Medications     alprazolam (XANAX) 0.25 MG tablet TAKE ONE TABLET BY MOUTH TWICE A DAY AS  NEEDED FOR ANXIETY    atorvastatin (LIPITOR) 10 MG tablet TAKE ONE TABLET BY MOUTH DAILY    b complex vitamins capsule Take 1 capsule by mouth once daily.    CALCIUM CARBONATE/VITAMIN D3 (VITAMIN D-3 ORAL) Take by mouth once daily.    cyanocobalamin (VITAMIN B-12) 1000 MCG tablet Every day    furosemide (LASIX) 20 MG tablet TAKE ONE TABLET BY MOUTH ONCE DAILY    JANTOVEN 5 mg tablet TAKE ONE TABLET BY MOUTH IN THE EVENING    losartan (COZAAR) 100 MG tablet TAKE ONE TABLET BY MOUTH DAILY    metoprolol succinate (TOPROL-XL) 100 MG 24 hr tablet TAKE ONE TABLET BY MOUTH DAILY    MULTAQ 400 mg Tab TAKE 1 TABLET (400 MG TOTAL)  BY MOUTH 2 (TWO) TIMES DAILY.    multivitamin capsule Take 1 capsule by mouth once daily.    omeprazole (PRILOSEC) 20 MG capsule Take 20 mg by mouth once daily.      potassium chloride (MICRO-K) 10 MEQ CpSR Take 1 capsule (10 mEq total) by mouth every Mon, Wed, Fri.    clindamycin (CLEOCIN) 300 MG capsule Take 1 capsule (300 mg total) by mouth 3 (three) times daily.           Clinical Reference Information           Allergies as of 3/8/2017     Ciprofloxacin    Iodine    Metronidazole Hcl    Amoxicillin    Epinephrine    Lidocaine    Penicillins    Sulfa (Sulfonamide Antibiotics)      Immunizations Administered on Date of Encounter - 3/8/2017     None      Orders Placed During Today's Visit      Normal Orders This Visit    POCT INR          3/8/2017  2:08 PM - Joi Sifuentes, PharmD      Component Results     Component    INR    3.6      January 2017 Details    Sun Mon Tue Wed Thu Fri Sat     1               2               3               4               5               6               7                 8               9               10               11               12               13               14                 15               16               17               18               19               20               21                 22               23               24               25                26   2.2   2.5 mg   See details      27      2.5 mg         28      2.5 mg           29      2.5 mg         30      2.5 mg         31      2.5 mg              Date Details   01/26 Last INR check   INR: 2.2                 February 2017 Details    Sun Mon Tue Wed Thu Fri Sat        1      2.5 mg         2      2.5 mg         3      2.5 mg         4      2.5 mg           5      2.5 mg         6      2.5 mg         7      2.5 mg         8      2.5 mg         9      2.5 mg         10      2.5 mg         11      2.5 mg           12      2.5 mg         13      2.5 mg         14      2.5 mg         15      2.5 mg         16      2.5 mg         17      2.5 mg         18      2.5 mg           19      2.5 mg         20      2.5 mg         21      2.5 mg         22      2.5 mg         23      2.5 mg         24      2.5 mg         25      2.5 mg           26      2.5 mg         27      2.5 mg         28      2.5 mg              Date Details   No additional details              March 2017 Details    Sun Mon Tue Wed Thu Fri Sat        1      2.5 mg         2      2.5 mg         3      2.5 mg         4      2.5 mg           5      2.5 mg         6      2.5 mg         7      2.5 mg         8   3.6   Hold   See details      9      2.5 mg         10      2.5 mg         11      2.5 mg           12      2.5 mg         13      2.5 mg         14      2.5 mg         15      2.5 mg         16      2.5 mg         17      2.5 mg         18      2.5 mg           19      2.5 mg         20      2.5 mg         21      2.5 mg         22      2.5 mg         23      2.5 mg         24      2.5 mg         25      2.5 mg           26      2.5 mg         27            28               29               30               31                 Date Details   03/08 This INR check   INR: 3.6       Date of next INR:  3/27/2017               How to take your warfarin dose     To take:  2.5 mg Take 0.5 of a 5 mg tablet.    Hold Do not take your warfarin  dose. See the Details table to the right for additional instructions.                Anticoagulation Summary as of 3/8/2017     Maintenance plan 2.5 mg (5 mg x 0.5) every day    Full instructions 3/8: Hold; Otherwise 2.5 mg every day    Next INR check 3/27/2017      Anticoagulation Episode Summary     Comments [facility] CVA 10/20/13--h/o CHF 4/15 no DDI with doxy pt has Jantoven vitk 1x/wk      Patient Findings     Positives Change in medications    Negatives Signs/symptoms of thrombosis, Signs/symptoms of bleeding, Laboratory test error suspected, Change in health, Change in alcohol use, Change in activity, Upcoming invasive procedure, Emergency department visit, Upcoming dental procedure, Missed doses, Extra doses, Change in diet/appetite, Hospital admission, Bruising, Other complaints    Comments Pt seen by pod today and placed on clindamycin x 10 days.  Infected corn      Language Assistance Services     ATTENTION: Language assistance services are available, free of charge. Please call [phone].      ATENCIÓN: Si habla roseline, tiene a mcelroy disposición servicios gratuitos de asistencia lingüística. Llame al [phone].     CHÚ Ý: N?u b?n nói Ti?ng Vi?t, có các d?ch v? h? tr? ngôn ng? mi?n phí dành cho b?n. G?i s? [phone].         [facility] complies with applicable Federal civil rights laws and does not discriminate on the basis of race, color, national origin, age, disability, or sex.

## 2017-03-08 NOTE — PROGRESS NOTES
INR above range today. Pt seen by podiatry today and placed on clindamycin x 10 days for  Infected corn.  She denies any other changes.  Will hold today & resume, recheck in 3 weeks when she RTC to f/u with podiatry

## 2017-03-08 NOTE — MR AVS SNAPSHOT
[facility]  [address] [place] [address]  Phone: [phone]                  Rody Avalos   3/8/2017 1:00 PM   Office Visit    Description:  Female : 1930   Provider:  Stan Guan DPM   Department:  [facility]           Reason for Visit     Routine Foot Care           Diagnoses this Visit        Comments    Ulcer of toe, left, limited to breakdown of skin    -  Primary     Idiopathic peripheral neuropathy         Cellulitis of toe of left foot                To Do List           Future Appointments        Provider Department Dept Phone    3/15/2017 1:20 PM Stan Guan DPM [facility] [phone]    4/3/2017 1:15 PM Adela Whitney MD [facility] [place] [facility] - Ophthalmology [phone]    4/10/2017 12:45 PM NM1 [facility] [phone]    4/10/2017 2:00 PM STRESS, [facility] Cardiology [phone]    2017 9:00 AM PACEMAKER [facility] [phone]      Goals (5 Years of Data)     None       These Medications        Disp Refills Start End    clindamycin (CLEOCIN) 300 MG capsule 30 capsule 0 3/8/2017 3/18/2017    Take 1 capsule (300 mg total) by mouth 3 (three) times daily. - Oral    Pharmacy: [id number] [place] - [address] [facility] Ph #: [phone]         [facility]     [facility] Nurse Care [facility] -  Assistance  Registered nurses in the [facility] provide clinical advisement, health education, appointment booking, and other advisory services.  Call for this free service at [phone].             Medications           Message regarding Medications     Verify the changes and/or additions to your medication regime listed below are the same as discussed with your clinician today.  If any of these changes or additions are incorrect, please notify your healthcare provider.        START taking these NEW medications        Refills    clindamycin  "(CLEOCIN) 300 MG capsule 0    Sig: Take 1 capsule (300 mg total) by mouth 3 (three) times daily.    Class: Normal    Route: Oral           Verify that the below list of medications is an accurate representation of the medications you are currently taking.  If none reported, the list may be blank. If incorrect, please contact your healthcare provider. Carry this list with you in case of emergency.           Current Medications     alprazolam (XANAX) 0.25 MG tablet TAKE ONE TABLET BY MOUTH TWICE A DAY AS NEEDED FOR ANXIETY    atorvastatin (LIPITOR) 10 MG tablet TAKE ONE TABLET BY MOUTH DAILY    b complex vitamins capsule Take 1 capsule by mouth once daily.    CALCIUM CARBONATE/VITAMIN D3 (VITAMIN D-3 ORAL) Take by mouth once daily.    cyanocobalamin (VITAMIN B-12) 1000 MCG tablet Every day    furosemide (LASIX) 20 MG tablet TAKE ONE TABLET BY MOUTH ONCE DAILY    JANTOVEN 5 mg tablet TAKE ONE TABLET BY MOUTH IN THE EVENING    losartan (COZAAR) 100 MG tablet TAKE ONE TABLET BY MOUTH DAILY    metoprolol succinate (TOPROL-XL) 100 MG 24 hr tablet TAKE ONE TABLET BY MOUTH DAILY    MULTAQ 400 mg Tab TAKE 1 TABLET (400 MG TOTAL)  BY MOUTH 2 (TWO) TIMES DAILY.    multivitamin capsule Take 1 capsule by mouth once daily.    omeprazole (PRILOSEC) 20 MG capsule Take 20 mg by mouth once daily.      potassium chloride (MICRO-K) 10 MEQ CpSR Take 1 capsule (10 mEq total) by mouth every Mon, Wed, Fri.    clindamycin (CLEOCIN) 300 MG capsule Take 1 capsule (300 mg total) by mouth 3 (three) times daily.           Clinical Reference Information           Your Vitals Were     Height Weight BMI          5' 2" (1.575 m) 64.6 kg (142 lb 6.7 oz) 26.05 kg/m2        Allergies as of 3/8/2017     Ciprofloxacin    Iodine    Metronidazole Hcl    Amoxicillin    Epinephrine    Lidocaine    Penicillins    Sulfa (Sulfonamide Antibiotics)      Immunizations Administered on Date of Encounter - 3/8/2017     None      Orders Placed During Today's Visit      " Normal Orders This Visit    Aerobic culture (Specify Source)     CULTURE, ANAEROBE       Language Assistance Services     ATTENTION: Language assistance services are available, free of charge. Please call [phone].      ATENCIÓN: Si habla roseline, tiene a mcelroy disposición servicios gratuitos de asistencia lingüística. Llame al [phone].     CHÚ Ý: N?u b?n nói Ti?ng Vi?t, có các d?ch v? h? tr? ngôn ng? mi?n phí dành cho b?n. G?i s? [phone].         [facility] complies with applicable Federal civil rights laws and does not discriminate on the basis of race, color, national origin, age, disability, or sex.

## 2017-03-09 LAB
ANION GAP SERPL CALC-SCNC: 9 MMOL/L
BUN SERPL-MCNC: 23 MG/DL
CALCIUM SERPL-MCNC: 9.2 MG/DL
CHLORIDE SERPL-SCNC: 108 MMOL/L
CO2 SERPL-SCNC: 21 MMOL/L
CREAT SERPL-MCNC: 1.1 MG/DL
EST. GFR  (AFRICAN AMERICAN): 52.2 ML/MIN/1.73 M^2
EST. GFR  (NON AFRICAN AMERICAN): 45.3 ML/MIN/1.73 M^2
GLUCOSE SERPL-MCNC: 83 MG/DL
POTASSIUM SERPL-SCNC: 5 MMOL/L
SODIUM SERPL-SCNC: 138 MMOL/L

## 2017-03-09 NOTE — PROGRESS NOTES
Subjective:      Patient ID: Rody Avalos is a 87 y.o. female.    Chief Complaint: Routine Foot Care (Neuropathy callouse to Left 2nd toe)  Patient presents to clinic with the complaint of pain in the Lt. 2nd toe.  Relates having pain in the toe for the past 7 days.  States it emits sharp pain with pressure.  Currently rates pain as a 0/10.  States symptoms are exacerbated with pressure and alleviated with rest.  Has not attempted to self treat.  Has noted redness and slight swelling to the toe for the past several days.  Denies having N/V/F/C/D.  Denies any additional pedal complaints.     Review of Systems   Constitution: Negative for chills, decreased appetite, diaphoresis and fever.   Cardiovascular: Negative for claudication and leg swelling.   Skin: Positive for color change, dry skin and nail changes.   Musculoskeletal: Positive for arthritis and joint pain. Negative for back pain and falls.   Neurological: Positive for numbness and paresthesias.   Psychiatric/Behavioral: Negative for altered mental status.           Objective:      Physical Exam   Constitutional: She is oriented to person, place, and time. She appears well-developed and well-nourished. No distress.   Cardiovascular:   Pulses:       Dorsalis pedis pulses are 2+ on the right side, and 2+ on the left side.        Posterior tibial pulses are 1+ on the right side, and 1+ on the left side.   CFT <3 seconds bilateral.  Pedal hair growth decreased bilateral.  Varicosities noted to bilateral lower extremity.  1+ pitting edema noted to bilateral lower extremity.  Toes are cool to touch bilateral.     Musculoskeletal: She exhibits edema and tenderness.   Muscle strength 5/5 in all muscle groups bilateral.  No tenderness nor crepitation with ROM of foot/ankle joints bilateral.  Pain with palpation of the wound of the Lt. 2nd toe.   Bilateral pes cavus foot type.  Bilateral hallux abducto valgus.  Bilateral semi-rigid contracture of toes 2-5 with  overlapping of the Lt.  2nd toe on the 1st.     Neurological: She is alert and oriented to person, place, and time. She has normal strength. A sensory deficit is present.   Protective sensation per [place] monofilament decreased bilateral.    Vibratory sensation decreased bilateral.    Light touch intact bilateral.   Skin: Skin is warm and dry. Lesion noted. No abrasion, no bruising, no burn, no ecchymosis, no laceration, no petechiae and no rash noted. She is not diaphoretic. No cyanosis or erythema. No pallor. Nails show no clubbing.   Open wound noted to the distal tip of the Lt. 2nd toe with trimming of callus.  Wound base is entirely granular.  Josefa wound is mildly edematous and erythematous  Josefa wound is devoid of fluctuance, purulence, and malodor.  Measures 0.2 x 0.1 x 0.1cm.             Assessment:       Encounter Diagnoses   Name Primary?    Idiopathic peripheral neuropathy     Ulcer of toe, left, limited to breakdown of skin Yes    Cellulitis of toe of left foot          Plan:       Rody Toledo was seen today for routine foot care.    Diagnoses and all orders for this visit:    Ulcer of toe, left, limited to breakdown of skin  -     Aerobic culture (Specify Source)  -     CULTURE, ANAEROBE  -     clindamycin (CLEOCIN) 300 MG capsule; Take 1 capsule (300 mg total) by mouth 3 (three) times daily.    Idiopathic peripheral neuropathy    Cellulitis of toe of left foot  -     Aerobic culture (Specify Source)  -     CULTURE, ANAEROBE  -     clindamycin (CLEOCIN) 300 MG capsule; Take 1 capsule (300 mg total) by mouth 3 (three) times daily.      I counseled the patient on her conditions, their implications and medical management.    With the patient's verbal consent, a sterile #15 scalpel was used to trim the lesion of the Lt. 2nd toe with exposure of an underlying wound.  No debridement of the wound warranted, as it was completely granular on exam.      Orders written for wound cultures.    Iodosorb  ointment was applied to the wound and covered with a bandage.    Given verbal and written instructions regarding wound care/dressing changes.      Advised to rest and elevate the affected extremity.    Advised to limit weight bearing to facilitate wound healing.    Prescription written for Clindamycin.    RTC in 1 week for follow up.    Stan Guan DPM

## 2017-03-10 LAB — BACTERIA SPEC AEROBE CULT: NORMAL

## 2017-03-13 LAB — BACTERIA SPEC ANAEROBE CULT: NORMAL

## 2017-03-15 ENCOUNTER — OFFICE VISIT (OUTPATIENT)
Dept: PODIATRY | Facility: CLINIC | Age: 82
End: 2017-03-15
Payer: MEDICARE

## 2017-03-15 VITALS — WEIGHT: 142.63 LBS | BODY MASS INDEX: 26.25 KG/M2 | HEIGHT: 62 IN

## 2017-03-15 DIAGNOSIS — L97.521 ULCER OF TOE, LEFT, LIMITED TO BREAKDOWN OF SKIN: Primary | ICD-10-CM

## 2017-03-15 DIAGNOSIS — G60.9 IDIOPATHIC PERIPHERAL NEUROPATHY: ICD-10-CM

## 2017-03-15 DIAGNOSIS — M20.40 HAMMER TOE, UNSPECIFIED LATERALITY: ICD-10-CM

## 2017-03-15 PROCEDURE — 1159F MED LIST DOCD IN RCRD: CPT | Mod: S$GLB,,, | Performed by: PODIATRIST

## 2017-03-15 PROCEDURE — 1160F RVW MEDS BY RX/DR IN RCRD: CPT | Mod: S$GLB,,, | Performed by: PODIATRIST

## 2017-03-15 PROCEDURE — 1157F ADVNC CARE PLAN IN RCRD: CPT | Mod: S$GLB,,, | Performed by: PODIATRIST

## 2017-03-15 PROCEDURE — 99999 PR PBB SHADOW E&M-EST. PATIENT-LVL III: CPT | Mod: PBBFAC,,, | Performed by: PODIATRIST

## 2017-03-15 PROCEDURE — 99213 OFFICE O/P EST LOW 20 MIN: CPT | Mod: S$GLB,,, | Performed by: PODIATRIST

## 2017-03-15 PROCEDURE — 1126F AMNT PAIN NOTED NONE PRSNT: CPT | Mod: S$GLB,,, | Performed by: PODIATRIST

## 2017-03-15 PROCEDURE — 99499 UNLISTED E&M SERVICE: CPT | Mod: S$GLB,,, | Performed by: PODIATRIST

## 2017-03-15 NOTE — PROGRESS NOTES
Subjective:      Patient ID: Rody Avalos is a 87 y.o. female.    Chief Complaint: Wound Care (wd to lft.2nd toe   PCP Jimenez 2/6/17)  Patient presents to clinic for a 1 week wound check of the Lt. Foot.  Denies pain to the affected extremity with today's exam.  Has been daily applying triple antibiotic ointment to the wound and covering with a bandage.  Relates minimal ambulation to the affected extremity.  Admits to taking the previously prescribed Clindamycin daily.  Denies having N/V/F/C/D.  Denies any additional pedal complaints.     Review of Systems   Constitution: Negative for chills, decreased appetite, diaphoresis and fever.   Cardiovascular: Negative for claudication and leg swelling.   Skin: Positive for color change, dry skin and nail changes.   Musculoskeletal: Positive for arthritis and joint pain. Negative for back pain and falls.   Neurological: Positive for numbness and paresthesias.   Psychiatric/Behavioral: Negative for altered mental status.           Objective:      Physical Exam   Constitutional: She is oriented to person, place, and time. She appears well-developed and well-nourished. No distress.   Cardiovascular:   Pulses:       Dorsalis pedis pulses are 2+ on the right side, and 2+ on the left side.        Posterior tibial pulses are 1+ on the right side, and 1+ on the left side.   CFT <3 seconds bilateral.  Pedal hair growth decreased bilateral.  Varicosities noted to bilateral lower extremity.  1+ pitting edema noted to bilateral lower extremity.  Toes are cool to touch bilateral.     Musculoskeletal: She exhibits edema. She exhibits no tenderness.   Muscle strength 5/5 in all muscle groups bilateral.  No tenderness nor crepitation with ROM of foot/ankle joints bilateral.  No pain with palpation of bilateral foot and ankle.   Bilateral pes cavus foot type.  Bilateral hallux abducto valgus.  Bilateral semi-rigid contracture of toes 2-5 with overlapping of the Lt.  2nd toe on the 1st.      Neurological: She is alert and oriented to person, place, and time. She has normal strength. A sensory deficit is present.   Protective sensation per [place]lement[place] monofilament decreased bilateral.    Vibratory sensation decreased bilateral.    Light touch intact bilateral.   Skin: Skin is warm and dry. Lesion noted. No abrasion, no bruising, no burn, no ecchymosis, no laceration, no petechiae and no rash noted. She is not diaphoretic. No cyanosis or erythema. No pallor. Nails show no clubbing.   Open wound noted to the distal tip of the Lt. 2nd toe with trimming of callus.  Wound base is entirely granular.  Josefa wound is devoid of erythema, edema, fluctuance, purulence, and malodor.  Measures 0.1 x 0.1 x 0.1cm.             Assessment:       Encounter Diagnoses   Name Primary?    Ulcer of toe, left, limited to breakdown of skin Yes    Idiopathic peripheral neuropathy     Hammer toe, unspecified laterality          Plan:       Rody Toledo was seen today for wound care.    Diagnoses and all orders for this visit:    Ulcer of toe, left, limited to breakdown of skin    Idiopathic peripheral neuropathy    Hammer toe, unspecified laterality      I counseled the patient on her conditions, their implications and medical management.    No debridement of the wound warranted as it is completely granular with today's exam.    Mariposa was applied to the wound and covered with a bandage.    To continue offloading with the crest pad.    Instructed to continue current wound care and dressing change instructions.    Advised to rest and elevate the affected extremity.    Advised to limit weight bearing to facilitate wound healing.    Advised to discontinue Clindamycin as cultures were negative for infection.    RTC in 3 months for routine evaluation or sooner if the toe wound has failed to heal within the next 7 days.    Stan Guan DPM

## 2017-03-15 NOTE — MR AVS SNAPSHOT
[facility]  [address]  [place] [address]  Phone: [phone]                  Rody Avalos   3/15/2017 1:20 PM   Office Visit    Description:  Female : 1930   Provider:  Stan Guan DPM   Department:  [place] [facility]           Reason for Visit     Wound Care           Diagnoses this Visit        Comments    Ulcer of toe, left, limited to breakdown of skin    -  Primary     Idiopathic peripheral neuropathy         Hammer toe, unspecified laterality                To Do List           Future Appointments        Provider Department Dept Phone    3/27/2017 1:45 PM Joi Sifuentes, PharmD [place] - Coumadin [phone]    4/3/2017 1:15 PM Adela Whitney MD [facility] - Ophthalmology [phone]    4/10/2017 12:45 PM NM1 [facility] [phone]    4/10/2017 2:00 PM STRESS, [place] - Cardiology [phone]    2017 9:00 AM PACEMAKER [facility] [phone]      Goals (5 Years of Data)     None      Follow-Up and Disposition     Return in about 3 months (around 6/15/2017).      [facility]     [facility] Care [facility] - 24/7 Assistance  Registered nurses in the [facility] provide clinical advisement, health education, appointment booking, and other advisory services.  Call for this free service at [phone].             Medications           Message regarding Medications     Verify the changes and/or additions to your medication regime listed below are the same as discussed with your clinician today.  If any of these changes or additions are incorrect, please notify your healthcare provider.             Verify that the below list of medications is an accurate representation of the medications you are currently taking.  If none reported, the list may be blank. If incorrect, please contact your healthcare provider. Carry this list with you in case of emergency.          "  Current Medications     alprazolam (XANAX) 0.25 MG tablet TAKE ONE TABLET BY MOUTH TWICE A DAY AS NEEDED FOR ANXIETY    atorvastatin (LIPITOR) 10 MG tablet TAKE ONE TABLET BY MOUTH DAILY    b complex vitamins capsule Take 1 capsule by mouth once daily.    CALCIUM CARBONATE/VITAMIN D3 (VITAMIN D-3 ORAL) Take by mouth once daily.    clindamycin (CLEOCIN) 300 MG capsule Take 1 capsule (300 mg total) by mouth 3 (three) times daily.    cyanocobalamin (VITAMIN B-12) 1000 MCG tablet Every day    furosemide (LASIX) 20 MG tablet TAKE ONE TABLET BY MOUTH ONCE DAILY    JANTOVEN 5 mg tablet TAKE ONE TABLET BY MOUTH IN THE EVENING    losartan (COZAAR) 100 MG tablet TAKE ONE TABLET BY MOUTH DAILY    metoprolol succinate (TOPROL-XL) 100 MG 24 hr tablet TAKE ONE TABLET BY MOUTH DAILY    MULTAQ 400 mg Tab TAKE 1 TABLET (400 MG TOTAL)  BY MOUTH 2 (TWO) TIMES DAILY.    multivitamin capsule Take 1 capsule by mouth once daily.    omeprazole (PRILOSEC) 20 MG capsule Take 20 mg by mouth once daily.      potassium chloride (MICRO-K) 10 MEQ CpSR Take 1 capsule (10 mEq total) by mouth every Mon, Wed, Fri.           Clinical Reference Information           Your Vitals Were     Height Weight BMI          5' 2" (1.575 m) 64.7 kg (142 lb 10.2 oz) 26.09 kg/m2        Allergies as of 3/15/2017     Ciprofloxacin    Iodine    Metronidazole Hcl    Amoxicillin    Epinephrine    Lidocaine    Penicillins    Sulfa (Sulfonamide Antibiotics)      Immunizations Administered on Date of Encounter - 3/15/2017     None      Language Assistance Services     ATTENTION: Language assistance services are available, free of charge. Please call [phone].      ATENCIÓN: Si habla español, tiene a mcelroy disposición servicios gratuitos de asistencia lingüística. Llame al [phone].     ANDREA Ý: N?u b?n nói Ti?ng Vi?t, có các d?ch v? h? tr? ngôn ng? mi?n phí dành cho b?n. G?i s? [phone].         [facility] complies with applicable Federal civil " rights laws and does not discriminate on the basis of race, color, national origin, age, disability, or sex.

## 2017-03-20 ENCOUNTER — TELEPHONE (OUTPATIENT)
Dept: FAMILY MEDICINE | Facility: CLINIC | Age: 82
End: 2017-03-20

## 2017-03-20 DIAGNOSIS — J84.9 INTERSTITIAL LUNG DISEASE: Primary | ICD-10-CM

## 2017-03-20 NOTE — TELEPHONE ENCOUNTER
----- Message from Bridget Colindres sent at 3/20/2017 10:33 AM CDT -----  Patient is calling for Pulmonary test results. Please call patient at [phone] or [phone]. Thanks!

## 2017-03-20 NOTE — TELEPHONE ENCOUNTER
Please call Paris with the phone # for Dr. Schwartz.  I discussed her PFT with her. Possible interstitial lung disease.

## 2017-03-26 RX ORDER — LOSARTAN POTASSIUM 100 MG/1
TABLET ORAL
Qty: 90 TABLET | Refills: 3 | Status: SHIPPED | OUTPATIENT
Start: 2017-03-26 | End: 2017-05-04 | Stop reason: SDUPTHER

## 2017-03-26 RX ORDER — ATORVASTATIN CALCIUM 10 MG/1
TABLET, FILM COATED ORAL
Qty: 90 TABLET | Refills: 3 | Status: SHIPPED | OUTPATIENT
Start: 2017-03-26 | End: 2017-07-20 | Stop reason: SDUPTHER

## 2017-03-27 ENCOUNTER — OFFICE VISIT (OUTPATIENT)
Dept: FAMILY MEDICINE | Facility: CLINIC | Age: 82
End: 2017-03-27
Payer: MEDICARE

## 2017-03-27 VITALS
DIASTOLIC BLOOD PRESSURE: 64 MMHG | HEART RATE: 60 BPM | SYSTOLIC BLOOD PRESSURE: 118 MMHG | WEIGHT: 143.5 LBS | OXYGEN SATURATION: 92 % | TEMPERATURE: 98 F | BODY MASS INDEX: 26.25 KG/M2

## 2017-03-27 DIAGNOSIS — J84.10 PULMONARY FIBROSIS: ICD-10-CM

## 2017-03-27 PROCEDURE — 1159F MED LIST DOCD IN RCRD: CPT | Mod: S$GLB,,, | Performed by: FAMILY MEDICINE

## 2017-03-27 PROCEDURE — 99499 UNLISTED E&M SERVICE: CPT | Mod: S$GLB,,, | Performed by: FAMILY MEDICINE

## 2017-03-27 PROCEDURE — 1126F AMNT PAIN NOTED NONE PRSNT: CPT | Mod: S$GLB,,, | Performed by: FAMILY MEDICINE

## 2017-03-27 PROCEDURE — 1157F ADVNC CARE PLAN IN RCRD: CPT | Mod: S$GLB,,, | Performed by: FAMILY MEDICINE

## 2017-03-27 PROCEDURE — 99214 OFFICE O/P EST MOD 30 MIN: CPT | Mod: S$GLB,,, | Performed by: FAMILY MEDICINE

## 2017-03-27 PROCEDURE — 1160F RVW MEDS BY RX/DR IN RCRD: CPT | Mod: S$GLB,,, | Performed by: FAMILY MEDICINE

## 2017-03-27 PROCEDURE — 99999 PR PBB SHADOW E&M-EST. PATIENT-LVL III: CPT | Mod: PBBFAC,,, | Performed by: FAMILY MEDICINE

## 2017-03-27 RX ORDER — ALBUTEROL SULFATE 90 UG/1
2 AEROSOL, METERED RESPIRATORY (INHALATION) EVERY 6 HOURS PRN
Qty: 18 G | Refills: 12 | Status: SHIPPED | OUTPATIENT
Start: 2017-03-27 | End: 2017-04-25

## 2017-03-27 RX ORDER — BUDESONIDE AND FORMOTEROL FUMARATE DIHYDRATE 80; 4.5 UG/1; UG/1
2 AEROSOL RESPIRATORY (INHALATION) 2 TIMES DAILY
Qty: 10 G | Refills: 2 | Status: SHIPPED | OUTPATIENT
Start: 2017-03-27 | End: 2017-04-25

## 2017-03-27 RX ORDER — PREDNISONE 20 MG/1
20 TABLET ORAL DAILY
Qty: 20 TABLET | Refills: 0 | Status: SHIPPED | OUTPATIENT
Start: 2017-03-27 | End: 2017-04-06

## 2017-03-27 NOTE — PROGRESS NOTES
Subjective:       Patient ID: Rody Avalos is a 87 y.o. female.    Chief Complaint: Shortness of Breath (SOB episodes. Unable to get in w/ Pulm until May)    HPI Comments: Shortness of breath gets progressively worse.  She gets dyspneic walking from one room to another in her house.  She also complains of nasal congestion, runny nose, watery eyes.  The cough is sometimes productive.  Sometimes there was a streak of blood.  She has heard wheezing.  She has no increase in her dyspnea with lying down.  Her pulmonary function showed a reduced lung capacity as well as a decreased diffusion capacity.  There was a nonsignificant improvement in flow rate with bronchodilator.  She has an appointment with pulmonary but not until May.  I reviewed previous chest x-rays.  She does have an elevation of the right hemidiaphragm and increased markings that are more prominent on the right.  There was one reference to some pulmonary congestion at one point.  That apparently resolved.  Her cardiac evaluation is been fairly unremarkable other than an elevated pulmonary artery pressure.  She does not have a history of asthma or ALLERGY.  She has not smoked in more than 50 years.    Shortness of Breath   Pertinent negatives include no chest pain, fever or leg swelling.     Review of Systems   Constitutional: Negative for fever and unexpected weight change.   Respiratory: Positive for cough and shortness of breath.    Cardiovascular: Negative for chest pain and leg swelling.       Objective:     Blood pressure 118/64, pulse 60, temperature 98.1 °F (36.7 °C), temperature source Oral, weight 65.1 kg (143 lb 8.3 oz), SpO2 (!) 92 %.      Physical Exam   Constitutional: She appears well-nourished. She appears distressed.   HENT:   Mild nasal mucosal edema.  Sinuses are nontender.  Throat is normal.   Eyes:   Conjunctival injection.   Neck: No thyromegaly present.   Cardiovascular: Normal rate, regular rhythm and normal heart sounds.     No murmur heard.  No JVD   Pulmonary/Chest:   Her effort at rest is fairly normal.  She does have right lung dry crackles.  A few crackles on the left.   Lymphadenopathy:     She has no cervical adenopathy.       Assessment:       1. Pulmonary fibrosis        Plan:       Trial of albuterol inhaler and Symbicort.  Plan B is prednisone 20 mg daily.  I would like to see her next week.  Chest CT scan ordered.   I will send a copy of this note to Dr. Asher, with whom she has an appointment. 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

## 2017-03-27 NOTE — MR AVS SNAPSHOT
[facility]  [address]  [place] [address]  Phone: [phone]  Fax: [phone]                  Rody Avalos   3/27/2017 3:15 PM   Office Visit    Description:  Female : 1930   Provider:  Cameron Gaona MD   Department:  [facility]           Reason for Visit     Shortness of Breath           Diagnoses this Visit        Comments    Pulmonary fibrosis                To Do List           Future Appointments        Provider Department Dept Phone    3/28/2017 1:15 PM Katia Mixon, PharmD [place] - Coumadin [phone]    4/3/2017 1:15 PM Adela Whitney MD [place] [address] - Ophthalmology [phone]    2017 1:00 PM LAB, [place] [facility] [phone]    2017 1:15 PM [facility] CT1 LIMIT [address] L[address] [facility] [phone]    4/10/2017 12:45 PM NM1 [facility] [phone]      Goals (5 Years of Data)     None       These Medications        Disp Refills Start End    albuterol 90 mcg/actuation inhaler 18 g 12 3/27/2017     Inhale 2 puffs into the lungs every 6 (six) hours as needed for Wheezing. - Inhalation    Ph[facility]: [id number] [place] [address] [facility] Ph #: [phone]       budesonide-formoterol 80-4.5 mcg (SYMBICORT) 80-4.5 mcg/actuation HFAA 10 g 2 3/27/2017 3/27/2018    Inhale 2 puffs into the lungs 2 (two) times daily. - Inhalation    [facility]: [id number] [place] [address] [facility] Ph #: [phone]       predniSONE (DELTASONE) 20 MG tablet 20 tablet 0 3/27/2017 2017    Take 1 tablet (20 mg total) by mouth once daily. - Oral    Ph[facility]acy: [id number] [place] [address] [facility] Ph #: [phone]         [facility] On Call     [facility] Care [facility] -  Assistance  Registered nurses in the [facility] provide clinical advisement, health education, appointment booking, and other  advisory services.  Call for this free service at [phone].             Medications           Message regarding Medications     Verify the changes and/or additions to your medication regime listed below are the same as discussed with your clinician today.  If any of these changes or additions are incorrect, please notify your healthcare provider.        START taking these NEW medications        Refills    albuterol 90 mcg/actuation inhaler 12    Sig: Inhale 2 puffs into the lungs every 6 (six) hours as needed for Wheezing.    Class: Normal    Route: Inhalation    budesonide-formoterol 80-4.5 mcg (SYMBICORT) 80-4.5 mcg/actuation HFAA 2    Sig: Inhale 2 puffs into the lungs 2 (two) times daily.    Class: Normal    Route: Inhalation    predniSONE (DELTASONE) 20 MG tablet 0    Sig: Take 1 tablet (20 mg total) by mouth once daily.    Class: Print    Route: Oral           Verify that the below list of medications is an accurate representation of the medications you are currently taking.  If none reported, the list may be blank. If incorrect, please contact your healthcare provider. Carry this list with you in case of emergency.           Current Medications     albuterol 90 mcg/actuation inhaler Inhale 2 puffs into the lungs every 6 (six) hours as needed for Wheezing.    alprazolam (XANAX) 0.25 MG tablet TAKE ONE TABLET BY MOUTH TWICE A DAY AS NEEDED FOR ANXIETY    atorvastatin (LIPITOR) 10 MG tablet TAKE ONE TABLET BY MOUTH ONCE DAILY    b complex vitamins capsule Take 1 capsule by mouth once daily.    budesonide-formoterol 80-4.5 mcg (SYMBICORT) 80-4.5 mcg/actuation HFAA Inhale 2 puffs into the lungs 2 (two) times daily.    CALCIUM CARBONATE/VITAMIN D3 (VITAMIN D-3 ORAL) Take by mouth once daily.    cyanocobalamin (VITAMIN B-12) 1000 MCG tablet Every day    furosemide (LASIX) 20 MG tablet TAKE ONE TABLET BY MOUTH ONCE DAILY    JANTOVEN 5 mg tablet TAKE ONE TABLET BY MOUTH IN THE EVENING    losartan (COZAAR) 100 MG  tablet TAKE ONE TABLET BY MOUTH DAILY    metoprolol succinate (TOPROL-XL) 100 MG 24 hr tablet TAKE ONE TABLET BY MOUTH DAILY    MULTAQ 400 mg Tab TAKE 1 TABLET (400 MG TOTAL)  BY MOUTH 2 (TWO) TIMES DAILY.    multivitamin capsule Take 1 capsule by mouth once daily.    omeprazole (PRILOSEC) 20 MG capsule Take 20 mg by mouth once daily.      potassium chloride (MICRO-K) 10 MEQ CpSR Take 1 capsule (10 mEq total) by mouth every Mon, Wed, Fri.    predniSONE (DELTASONE) 20 MG tablet Take 1 tablet (20 mg total) by mouth once daily.           Clinical Reference Information           Your Vitals Were     BP Pulse Temp Weight SpO2 BMI    118/64 (BP Location: Left arm) 60 98.1 °F (36.7 °C) (Oral) 65.1 kg (143 lb 8.3 oz) 92% 26.25 kg/m2      Blood Pressure          Most Recent Value    BP  118/64      Allergies as of 3/27/2017     Ciprofloxacin    Iodine    Metronidazole Hcl    Amoxicillin    Epinephrine    Lidocaine    Penicillins    Sulfa (Sulfonamide Antibiotics)      Immunizations Administered on Date of Encounter - 3/27/2017     None      Orders Placed During Today's Visit     Future Labs/Procedures Expected by Expires    Creatinine, serum  3/27/2017 5/26/2018    CT Chest With Contrast  3/27/2017 3/27/2018      Language Assistance Services     ATTENTION: Language assistance services are available, free of charge. Please call [phone].      ATENCIÓN: Si preet dukes, tiene a mcelroy disposición servicios gratuitos de asistencia lingüística. Llame al [phone].     CHÚ Ý: N?u b?n nói Ti?ng Vi?t, có các d?ch v? h? tr? ngôn ng? mi?n phí dành cho b?n. G?i s? [phone].         [facility] complies with applicable Federal civil rights laws and does not discriminate on the basis of race, color, national origin, age, disability, or sex.

## 2017-03-28 ENCOUNTER — ANTI-COAG VISIT (OUTPATIENT)
Dept: CARDIOLOGY | Facility: CLINIC | Age: 82
End: 2017-03-28
Payer: MEDICARE

## 2017-03-28 ENCOUNTER — TELEPHONE (OUTPATIENT)
Dept: FAMILY MEDICINE | Facility: CLINIC | Age: 82
End: 2017-03-28

## 2017-03-28 DIAGNOSIS — Z79.01 LONG TERM (CURRENT) USE OF ANTICOAGULANTS: Primary | ICD-10-CM

## 2017-03-28 DIAGNOSIS — I48.0 PAF (PAROXYSMAL ATRIAL FIBRILLATION): ICD-10-CM

## 2017-03-28 DIAGNOSIS — I63.9 CEREBROVASCULAR ACCIDENT (CVA), UNSPECIFIED MECHANISM: ICD-10-CM

## 2017-03-28 LAB — INR PPP: 2.4 (ref 2–3)

## 2017-03-28 PROCEDURE — 99211 OFF/OP EST MAY X REQ PHY/QHP: CPT | Mod: 25,S$GLB,,

## 2017-03-28 PROCEDURE — 85610 PROTHROMBIN TIME: CPT | Mod: QW,S$GLB,,

## 2017-03-28 NOTE — MR AVS SNAPSHOT
[place] [facility]  [address] [facility] [address]  [facility] [address]  Phone: [phone]                  Rody Avalos   3/28/2017 1:15 PM   Anti-coag visit    Description:  Female : 1930   Provider:  Katia Mixon PharmD   Department:  [facility] Coumadin           Diagnoses this Visit        Comments    Long term (current) use of anticoagulants    -  Primary     Cerebrovascular accident (CVA), unspecified mechanism         PAF (paroxysmal atrial fibrillation)                To Do List           Future Appointments        Provider Department Dept Phone    3/28/2017 1:15 PM [facility] [phone]    4/3/2017 1:15 PM Adela Whitney MD [facility] - Ophthalmology [phone]    2017 1:00 PM LAB, [place] [facility] [phone]    2017 1:15 PM [facility] CT1 LIMIT [address] [facility] [phone]    4/10/2017 12:45 PM NM1 [facility] [phone]      Goals (5 Years of Data)     None      [facility]     [facility] Care [facility] - 7 Assistance  Registered nurses in the [facility] provide clinical advisement, health education, appointment booking, and other advisory services.  Call for this free service at [phone].             Medications           Message regarding Medications     Verify the changes and/or additions to your medication regime listed below are the same as discussed with your clinician today.  If any of these changes or additions are incorrect, please notify your healthcare provider.             Verify that the below list of medications is an accurate representation of the medications you are currently taking.  If none reported, the list may be blank. If incorrect, please contact your healthcare provider. Carry this list with you in case of emergency.           Current Medications     albuterol 90 mcg/actuation inhaler Inhale 2 puffs into the lungs  every 6 (six) hours as needed for Wheezing.    alprazolam (XANAX) 0.25 MG tablet TAKE ONE TABLET BY MOUTH TWICE A DAY AS NEEDED FOR ANXIETY    atorvastatin (LIPITOR) 10 MG tablet TAKE ONE TABLET BY MOUTH ONCE DAILY    b complex vitamins capsule Take 1 capsule by mouth once daily.    budesonide-formoterol 80-4.5 mcg (SYMBICORT) 80-4.5 mcg/actuation HFAA Inhale 2 puffs into the lungs 2 (two) times daily.    CALCIUM CARBONATE/VITAMIN D3 (VITAMIN D-3 ORAL) Take by mouth once daily.    cyanocobalamin (VITAMIN B-12) 1000 MCG tablet Every day    furosemide (LASIX) 20 MG tablet TAKE ONE TABLET BY MOUTH ONCE DAILY    JANTOVEN 5 mg tablet TAKE ONE TABLET BY MOUTH IN THE EVENING    losartan (COZAAR) 100 MG tablet TAKE ONE TABLET BY MOUTH DAILY    metoprolol succinate (TOPROL-XL) 100 MG 24 hr tablet TAKE ONE TABLET BY MOUTH DAILY    MULTAQ 400 mg Tab TAKE 1 TABLET (400 MG TOTAL)  BY MOUTH 2 (TWO) TIMES DAILY.    multivitamin capsule Take 1 capsule by mouth once daily.    omeprazole (PRILOSEC) 20 MG capsule Take 20 mg by mouth once daily.      potassium chloride (MICRO-K) 10 MEQ CpSR Take 1 capsule (10 mEq total) by mouth every Mon, Wed, Fri.    predniSONE (DELTASONE) 20 MG tablet Take 1 tablet (20 mg total) by mouth once daily.           Clinical Reference Information           Allergies as of 3/28/2017     Ciprofloxacin    Iodine    Metronidazole Hcl    Amoxicillin    Epinephrine    Lidocaine    Penicillins    Sulfa (Sulfonamide Antibiotics)      Immunizations Administered on Date of Encounter - 3/28/2017     None      Orders Placed During Today's Visit      Normal Orders This Visit    POCT INR          3/28/2017  1:10 PM - Katia Mixon, JaydonD      Component Results     Component Value Flag Ref Range Units Status    INR 2.4  2.0 - 3.0  Final      February 2017 Details    Sun Mon Tue Wed Thu Fri Sat        1               2               3               4                 5               6               7               8                9               10               11                 12               13               14               15               16               17               18                 19               20               21               22               23               24               25                 26      2.5 mg         27      2.5 mg         28      2.5 mg              Date Details   No additional details              March 2017 Details    Sun Mon Tue Wed Thu Fri Sat        1      2.5 mg         2      2.5 mg         3      2.5 mg         4      2.5 mg           5      2.5 mg         6      2.5 mg         7      2.5 mg         8   3.6   Hold   See details      9      2.5 mg         10      2.5 mg         11      2.5 mg           12      2.5 mg         13      2.5 mg         14      2.5 mg         15      2.5 mg         16      2.5 mg         17      2.5 mg         18      2.5 mg           19      2.5 mg         20      2.5 mg         21      2.5 mg         22      2.5 mg         23      2.5 mg         24      2.5 mg         25      2.5 mg           26      2.5 mg         27      2.5 mg         28   2.4   2.5 mg   See details      29      2.5 mg         30      2.5 mg         31      2.5 mg           Date Details   03/08 Last INR check   INR: 3.6      03/28 This INR check   INR: 2.4                     How to take your warfarin dose     To take:  2.5 mg Take 0.5 of a 5 mg tablet.           April 2017 Details    Sun Mon Tue Wed Thu Fri Sat           1      2.5 mg           2      2.5 mg         3      2.5 mg         4      2.5 mg         5      2.5 mg         6      2.5 mg         7      2.5 mg         8      2.5 mg           9      2.5 mg         10      2.5 mg         11      2.5 mg         12      2.5 mg         13      2.5 mg         14      2.5 mg         15      2.5 mg           16      2.5 mg         17      2.5 mg         18      2.5 mg         19      2.5 mg         20      2.5 mg         21      2.5 mg          22      2.5 mg           23      2.5 mg         24      2.5 mg         25            26               27               28               29                 30                      Date Details   No additional details    Date of next INR:  4/25/2017         How to take your warfarin dose     To take:  2.5 mg Take 0.5 of a 5 mg tablet.           Anticoagulation Summary as of 3/28/2017     Maintenance plan 2.5 mg (5 mg x 0.5) every day    Full instructions 2.5 mg every day    Next INR check 4/25/2017      Anticoagulation Episode Summary     Comments [facility] CVA 10/20/13--h/o CHF 4/15 no DDI with doxy pt has Jantoven vitk 1x/wk      Language Assistance Services     ATTENTION: Language assistance services are available, free of charge. Please call [phone].      ATENCIÓN: Si preet dukes, tiene a mcelroy disposición servicios gratuitos de asistencia lingüística. Llame al [phone].     CHÚ Ý: N?u b?n nói Ti?ng Vi?t, có các d?ch v? h? tr? ngôn ng? mi?n phí dành cho b?n. G?i s? [phone].         [place] [facility]in complies with applicable Federal civil rights laws and does not discriminate on the basis of race, color, national origin, age, disability, or sex.

## 2017-03-28 NOTE — TELEPHONE ENCOUNTER
----- Message from Katia Mixon PharmD sent at 3/28/2017  1:16 PM CDT -----  Patient reports that she would like to have lab work and CT on 4/3 if possible since she has an eye appointment this day as well. Thank you!

## 2017-03-28 NOTE — PROGRESS NOTES
Patient confirms dose and compliance. She reports that she was diagnosed with pulmonary fibrosis yesterday. She will use inhalers and if needed, plan B, is oral prednisone. Patient advised to call if needed. INR in range and stable otherwise. Will maintain dose and recheck in 4 weeks.

## 2017-03-31 ENCOUNTER — TELEPHONE (OUTPATIENT)
Dept: CARDIOLOGY | Facility: CLINIC | Age: 82
End: 2017-03-31

## 2017-03-31 ENCOUNTER — TELEPHONE (OUTPATIENT)
Dept: OPHTHALMOLOGY | Facility: CLINIC | Age: 82
End: 2017-03-31

## 2017-03-31 NOTE — TELEPHONE ENCOUNTER
----- Message from Adela Whitney MD sent at 3/30/2017  5:49 PM CDT -----  Regarding: [place] or [place]?  Ms. Avalos is 87 and we usually see her in [place]. May want to check and make sure she wants to come to [place]. Thank you.

## 2017-03-31 NOTE — TELEPHONE ENCOUNTER
----- Message from Fozia Amor sent at 3/31/2017  9:17 AM CDT -----  Contact: srikanth   Stating she has other Dx and cant or should not have testing, questioning this   Call back   Pulmonary hypertension..

## 2017-03-31 NOTE — TELEPHONE ENCOUNTER
"FYI: Pt canceled Stress test. States Pulm doctor DX'd her with Pulmonary Fibrosis and "all this testing is just too much for me, I can't breath."  Kept appt on 4/3 for CT CHEST WITH CONTRAST. Wants to find out wants going on first and states you may want her not to have test till you know what's going on with her.  "

## 2017-04-03 ENCOUNTER — HOSPITAL ENCOUNTER (OUTPATIENT)
Dept: RADIOLOGY | Facility: HOSPITAL | Age: 82
Discharge: HOME | End: 2017-04-03
Attending: FAMILY MEDICINE
Payer: MEDICARE

## 2017-04-03 DIAGNOSIS — J84.10 PULMONARY FIBROSIS: ICD-10-CM

## 2017-04-03 PROCEDURE — 71250 CT THORAX DX C-: CPT | Mod: 26,,, | Performed by: RADIOLOGY

## 2017-04-03 PROCEDURE — 71250 CT THORAX DX C-: CPT | Mod: TC,PO

## 2017-04-04 ENCOUNTER — TELEPHONE (OUTPATIENT)
Dept: FAMILY MEDICINE | Facility: CLINIC | Age: 82
End: 2017-04-04

## 2017-04-04 ENCOUNTER — CLINICAL SUPPORT (OUTPATIENT)
Dept: FAMILY MEDICINE | Facility: CLINIC | Age: 82
End: 2017-04-04
Payer: MEDICARE

## 2017-04-04 DIAGNOSIS — J84.10 PULMONARY FIBROSIS: Primary | ICD-10-CM

## 2017-04-04 PROCEDURE — 99211 OFF/OP EST MAY X REQ PHY/QHP: CPT | Mod: S$GLB,,, | Performed by: FAMILY MEDICINE

## 2017-04-04 NOTE — TELEPHONE ENCOUNTER
Spoke with pt. Pt states she does not know how to use her inhalers. Nurse gamalielt made for today.

## 2017-04-04 NOTE — TELEPHONE ENCOUNTER
----- Message from Majo Briones sent at 4/4/2017 10:30 AM CDT -----  Contact: Patient  Patient needs instructions on use of the inhalers you prescribed for her last week. Please call patient at [phone] or [phone] cell to advise.

## 2017-04-05 NOTE — PROGRESS NOTES
Pt came in to office d/t difficulty using inhalers prescribed. Demonstrated to pt, pt verbalized understanding, and did return demonstration.  Pt still having some difficulty with squeezing inhaler but is rec medication. Will send note to Dr Gaona advising of pt difficulty with use.

## 2017-04-10 RX ORDER — ALPRAZOLAM 0.25 MG/1
TABLET ORAL
Qty: 45 TABLET | Refills: 1 | Status: SHIPPED | OUTPATIENT
Start: 2017-04-10 | End: 2017-04-19 | Stop reason: SDUPTHER

## 2017-04-13 ENCOUNTER — OFFICE VISIT (OUTPATIENT)
Dept: FAMILY MEDICINE | Facility: CLINIC | Age: 82
End: 2017-04-13
Payer: MEDICARE

## 2017-04-13 VITALS
HEART RATE: 75 BPM | BODY MASS INDEX: 25.53 KG/M2 | RESPIRATION RATE: 18 BRPM | TEMPERATURE: 98 F | WEIGHT: 138.75 LBS | OXYGEN SATURATION: 94 % | DIASTOLIC BLOOD PRESSURE: 76 MMHG | HEIGHT: 62 IN | SYSTOLIC BLOOD PRESSURE: 132 MMHG

## 2017-04-13 DIAGNOSIS — J84.10 PULMONARY FIBROSIS: Primary | ICD-10-CM

## 2017-04-13 DIAGNOSIS — I27.20 PULMONARY HYPERTENSION: ICD-10-CM

## 2017-04-13 DIAGNOSIS — I48.0 PAF (PAROXYSMAL ATRIAL FIBRILLATION): ICD-10-CM

## 2017-04-13 PROCEDURE — 99999 PR PBB SHADOW E&M-EST. PATIENT-LVL III: CPT | Mod: PBBFAC,,, | Performed by: FAMILY MEDICINE

## 2017-04-13 PROCEDURE — 1126F AMNT PAIN NOTED NONE PRSNT: CPT | Mod: S$GLB,,, | Performed by: FAMILY MEDICINE

## 2017-04-13 PROCEDURE — 1157F ADVNC CARE PLAN IN RCRD: CPT | Mod: S$GLB,,, | Performed by: FAMILY MEDICINE

## 2017-04-13 PROCEDURE — 1160F RVW MEDS BY RX/DR IN RCRD: CPT | Mod: S$GLB,,, | Performed by: FAMILY MEDICINE

## 2017-04-13 PROCEDURE — 99214 OFFICE O/P EST MOD 30 MIN: CPT | Mod: S$GLB,,, | Performed by: FAMILY MEDICINE

## 2017-04-13 PROCEDURE — 1159F MED LIST DOCD IN RCRD: CPT | Mod: S$GLB,,, | Performed by: FAMILY MEDICINE

## 2017-04-13 PROCEDURE — 99499 UNLISTED E&M SERVICE: CPT | Mod: S$GLB,,, | Performed by: FAMILY MEDICINE

## 2017-04-13 RX ORDER — PREDNISONE 20 MG/1
20 TABLET ORAL DAILY
Qty: 10 TABLET | Refills: 0 | Status: SHIPPED | OUTPATIENT
Start: 2017-04-13 | End: 2017-04-23

## 2017-04-13 NOTE — PROGRESS NOTES
"Subjective:       Patient ID: Rody Avalos is a 87 y.o. female.    Chief Complaint: Follow-up (medication/inhaler) and Cough (pt states she has had increase in cough episodes, productive pink streaked)    HPI Comments: Follow-up dyspnea.  She continues to be significantly short of breath with mild exertion.  She also has a cough.  Sometimes some pink streaks.  She got no improvement with Symbicort.  Her appointment to see pulmonary is not until May 8.  I reviewed her CT scan which shows bilateral pulmonary fibrosis worse on the right.  She also has an elevation of right hemidiaphragm.  There are some indications of bronchiectasis.  The interstitial pattern suggests an autoimmune or possibly medication etiology.  She has a history of atrial fibrillation.  She is controlled with beta blocker, Multaq, Coumadin.  She has been on Multaq for many years.  She is not relating any chest pain.    Cough   Associated symptoms include shortness of breath. Pertinent negatives include no chest pain, fever or wheezing.     Review of Systems   Constitutional: Negative for fever and unexpected weight change.   Respiratory: Positive for cough and shortness of breath. Negative for wheezing.    Cardiovascular: Negative for chest pain, palpitations and leg swelling.       Objective:     Blood pressure 132/76, pulse 75, temperature 97.9 °F (36.6 °C), temperature source Oral, resp. rate 18, height 5' 2" (1.575 m), weight 62.9 kg (138 lb 12.5 oz), SpO2 (!) 94 %.      Physical Exam   Constitutional: She appears well-nourished. She appears distressed.   Cardiovascular: Normal rate and regular rhythm.    Murmur heard.  She does have some frequent PCs.   Pulmonary/Chest: Effort normal.   Basilar crackles more so on the right   Musculoskeletal: She exhibits no edema.   Neurological: She is alert.       Assessment:       1. Pulmonary fibrosis    2. Pulmonary hypertension    3. PAF (paroxysmal atrial fibrillation)        Plan:       I " reviewed past records.  She did have some atrial fibrillation last year.  Her most recent monitor showed sinus rhythm.  She does have an elevated pulmonary artery pressure of 45 and a significantly enlarged left atrium.  Trial of prednisone 20 mg daily.  I discussed with Dr. Alvarado and he agrees with stopping the Multaq.    she will be seeing pulmonary in 3 weeks.

## 2017-04-13 NOTE — MR AVS SNAPSHOT
[facility]  [address] [place] [address]  Phone: [phone]  Fax: [phone]                  Rody Avalos   2017 8:15 AM   Office Visit    Description:  Female : 1930   Provider:  Cameron Gaona MD   Department:  [facility]           Reason for Visit     Follow-up     Cough           Diagnoses this Visit        Comments    Pulmonary fibrosis    -  Primary     Pulmonary hypertension         PAF (paroxysmal atrial fibrillation)                To Do List           Future Appointments        Provider Department Dept Phone    2017 9:00 AM PACEMAKER [place] - Cardiology [phone]    2017 9:30 AM Aldo Alvarado MD [facility] Cardiology [phone]    2017 9:45 AM Jaydon MarshD [place] - Coumadin [phone]    2017 1:00 PM Adela Whitney MD [place] - Ophthalmology [phone]    6/15/2017 2:00 PM Stan Guan DPM [facility] Podiatry [phone]      Goals (5 Years of Data)     None       These Medications        Disp Refills Start End    predniSONE (DELTASONE) 20 MG tablet 10 tablet 0 2017    Take 1 tablet (20 mg total) by mouth once daily. - Oral    Ph[facility]: [id number] [place] - [address] [facility] Ph #: [phone]         [facility]     [facility] Care [facility] -  Assistance  Unless otherwise directed by your provider, please contact [facility], our nurse care line that is available for  assistance.     Registered nurses in the [facility] provide: appointment scheduling, clinical advisement, health education, and other advisory services.  Call: [phone] (toll free)               Medications           Message regarding Medications     Verify the changes and/or additions to your medication regime listed below are the same as discussed with your clinician today.  If any of these changes or  additions are incorrect, please notify your healthcare provider.        START taking these NEW medications        Refills    predniSONE (DELTASONE) 20 MG tablet 0    Sig: Take 1 tablet (20 mg total) by mouth once daily.    Class: Normal    Route: Oral           Verify that the below list of medications is an accurate representation of the medications you are currently taking.  If none reported, the list may be blank. If incorrect, please contact your healthcare provider. Carry this list with you in case of emergency.           Current Medications     albuterol 90 mcg/actuation inhaler Inhale 2 puffs into the lungs every 6 (six) hours as needed for Wheezing.    alprazolam (XANAX) 0.25 MG tablet TAKE ONE TABLET BY MOUTH TWICE A DAY AS NEEDED FOR ANXIETY    alprazolam (XANAX) 0.25 MG tablet TAKE ONE TABLET BY MOUTH TWICE A DAY AS NEEDED FOR ANXIETY    atorvastatin (LIPITOR) 10 MG tablet TAKE ONE TABLET BY MOUTH ONCE DAILY    b complex vitamins capsule Take 1 capsule by mouth once daily.    budesonide-formoterol 80-4.5 mcg (SYMBICORT) 80-4.5 mcg/actuation HFAA Inhale 2 puffs into the lungs 2 (two) times daily.    CALCIUM CARBONATE/VITAMIN D3 (VITAMIN D-3 ORAL) Take by mouth once daily.    cyanocobalamin (VITAMIN B-12) 1000 MCG tablet Every day    furosemide (LASIX) 20 MG tablet TAKE ONE TABLET BY MOUTH ONCE DAILY    JANTOVEN 5 mg tablet TAKE ONE TABLET BY MOUTH IN THE EVENING    losartan (COZAAR) 100 MG tablet TAKE ONE TABLET BY MOUTH DAILY    metoprolol succinate (TOPROL-XL) 100 MG 24 hr tablet TAKE ONE TABLET BY MOUTH DAILY    MULTAQ 400 mg Tab TAKE 1 TABLET (400 MG TOTAL)  BY MOUTH 2 (TWO) TIMES DAILY.    multivitamin capsule Take 1 capsule by mouth once daily.    omeprazole (PRILOSEC) 20 MG capsule Take 20 mg by mouth once daily.      potassium chloride (MICRO-K) 10 MEQ CpSR Take 1 capsule (10 mEq total) by mouth every Mon, Wed, Fri.    predniSONE (DELTASONE) 20 MG tablet Take 1 tablet (20 mg total) by mouth once  "daily.           Clinical Reference Information           Your Vitals Were     BP Pulse Temp Resp Height Weight    132/76 (BP Location: Left arm, Patient Position: Sitting) 75 97.9 °F (36.6 °C) (Oral) 18 5' 2" (1.575 m) 62.9 kg (138 lb 12.5 oz)    SpO2 BMI             94% 25.38 kg/m2         Blood Pressure          Most Recent Value    BP  132/76      Allergies as of 4/13/2017     Ciprofloxacin    Iodine    Metronidazole Hcl    Amoxicillin    Epinephrine    Lidocaine    Penicillins    Sulfa (Sulfonamide Antibiotics)      Immunizations Administered on Date of Encounter - 4/13/2017     None      Language Assistance Services     ATTENTION: Language assistance services are available, free of charge. Please call [phone].      ATENCIÓN: Si habla roseline, tiene a mcelroy disposición servicios gratuitos de asistencia lingüística. Llame al [phone].     ANDREA Ý: N?u b?n nói Ti?ng Vi?t, có các d?ch v? h? tr? ngôn ng? mi?n phí dành cho b?n. G?i s? [phone].         [facility] complies with applicable Federal civil rights laws and does not discriminate on the basis of race, color, national origin, age, disability, or sex.        "

## 2017-04-17 ENCOUNTER — TELEPHONE (OUTPATIENT)
Dept: FAMILY MEDICINE | Facility: CLINIC | Age: 82
End: 2017-04-17

## 2017-04-17 NOTE — TELEPHONE ENCOUNTER
Spoke w/ pt and advised to stop Multaq. Pt agreed. Pt has not started the Prednisone because she is afraid of the side effects. She is using the inhalers as directed, but does not want to take the prednisone. She would like to know Dr Gaona recommendation until she sees Pulm. She is willing to come in for appt to discuss uf need be. Please review and advise.

## 2017-04-17 NOTE — TELEPHONE ENCOUNTER
----- Message from Cameron Gaona MD sent at 4/13/2017 12:45 PM CDT -----  Please call her and tell her to stop the Multaq.  Tell her I discussed this with Dr. Arrieta.

## 2017-04-17 NOTE — TELEPHONE ENCOUNTER
My recommendation is to try the prednisone because that is what we use for lung inflammation. If she decides not to until she sees Dr. Asher, that is ok.

## 2017-04-18 NOTE — TELEPHONE ENCOUNTER
Spoke w/ pt. Advised as recommended. Pt has made an appt tomorrow to discuss pros/cons of the Prednisone and inhalers w/ Dr Gaona. Advised pt that keeping this appt may help her better decide on whether or not to take the Prednisone. Pt agreed.

## 2017-04-19 ENCOUNTER — OFFICE VISIT (OUTPATIENT)
Dept: FAMILY MEDICINE | Facility: CLINIC | Age: 82
End: 2017-04-19
Payer: MEDICARE

## 2017-04-19 VITALS
OXYGEN SATURATION: 88 % | WEIGHT: 136.25 LBS | HEART RATE: 70 BPM | DIASTOLIC BLOOD PRESSURE: 80 MMHG | BODY MASS INDEX: 24.92 KG/M2 | SYSTOLIC BLOOD PRESSURE: 122 MMHG | TEMPERATURE: 99 F

## 2017-04-19 DIAGNOSIS — J84.10 PULMONARY FIBROSIS: Primary | ICD-10-CM

## 2017-04-19 DIAGNOSIS — I48.0 PAF (PAROXYSMAL ATRIAL FIBRILLATION): ICD-10-CM

## 2017-04-19 PROCEDURE — 99999 PR PBB SHADOW E&M-EST. PATIENT-LVL III: CPT | Mod: PBBFAC,,, | Performed by: FAMILY MEDICINE

## 2017-04-19 PROCEDURE — 99213 OFFICE O/P EST LOW 20 MIN: CPT | Mod: S$GLB,,, | Performed by: FAMILY MEDICINE

## 2017-04-19 PROCEDURE — 99499 UNLISTED E&M SERVICE: CPT | Mod: S$GLB,,, | Performed by: FAMILY MEDICINE

## 2017-04-19 PROCEDURE — 1126F AMNT PAIN NOTED NONE PRSNT: CPT | Mod: S$GLB,,, | Performed by: FAMILY MEDICINE

## 2017-04-19 PROCEDURE — 1159F MED LIST DOCD IN RCRD: CPT | Mod: S$GLB,,, | Performed by: FAMILY MEDICINE

## 2017-04-19 PROCEDURE — 1160F RVW MEDS BY RX/DR IN RCRD: CPT | Mod: S$GLB,,, | Performed by: FAMILY MEDICINE

## 2017-04-19 RX ORDER — METOPROLOL SUCCINATE 100 MG/1
100 TABLET, EXTENDED RELEASE ORAL DAILY
Qty: 90 TABLET | Refills: 3
Start: 2017-04-19 | End: 2017-07-20 | Stop reason: SDUPTHER

## 2017-04-19 NOTE — PROGRESS NOTES
Subjective:       Patient ID: Rody Avalos is a 87 y.o. female.    Chief Complaint: Follow-up (Med f/u. Discuss Prednisone rx. Pt has been off Multaq since Mon.)    HPI Comments: Follow-up of medical problems.  She has progressive dyspnea over the past 4 months.  Symbicort has not helped.  She did not start the prednisone because she was fearful of possible side effects.  She has a chest CT that shows some interstitial changes, bronchiectasis, lymphadenopathy.  I had proposed prednisone as a trial.  Her appointment with Dr. Asher is only 2 weeks away now.  She stopped Multaq approximately 6 days ago.  I coordinated that change with Dr. Arrieta.    Review of Systems   Constitutional: Negative for fever.   Respiratory: Positive for shortness of breath. Negative for cough.    Gastrointestinal:        She had bad constipation over the weekend but finally resolved with a laxative.   Psychiatric/Behavioral: The patient is nervous/anxious.        Objective:     Blood pressure 122/80, pulse 70, temperature 98.7 °F (37.1 °C), temperature source Oral, weight 61.8 kg (136 lb 3.9 oz), SpO2 (!) 88 %.   the pulse ox was 88% after walking back to the examination room.  I repeated it a few minutes later and got 93% at rest.    Physical Exam   Constitutional: She appears well-nourished. No distress.   Cardiovascular: Normal rate and regular rhythm.    She does not feel like she is in atrial fibrillation today.  She does have a few PVCs   Pulmonary/Chest: Effort normal.       Assessment:       1. Pulmonary fibrosis    2. PAF (paroxysmal atrial fibrillation)        Plan:       At this point I think taking the prednisone as a trial is optional.  She may want to wait till she sees pulmonary.

## 2017-04-19 NOTE — MR AVS SNAPSHOT
[facility]  [address]  [place] [address]  Phone: [phone]  Fax: [phone]                  Rody Avalos   2017 3:30 PM   Office Visit    Description:  Female : 1930   Provider:  Cameron Gaona MD   Department:  [facility]           Reason for Visit     Follow-up                To Do List           Future Appointments        Provider Department Dept Phone    2017 9:00 AM PACEMAKER [place] - Cardiology [phone]    2017 9:30 AM Aldo Alvarado MD [facility] Cardiology [phone]    2017 9:45 AM Katia Mixon PharmD [facility] Coumadin [phone]    2017 1:00 PM Adela Whitney MD [place] - Ophthalmology [phone]    6/15/2017 2:00 PM Stan Guan DPM [facility] Podiatry [phone]      Goals (5 Years of Data)     None       These Medications        Disp Refills Start End    metoprolol succinate (TOPROL-XL) 100 MG 24 hr tablet 90 tablet 3 2017     Take 1 tablet (100 mg total) by mouth once daily. - Oral    Pharmacy: [id number] [place] - [address] #: [phone]         [facility]     [facility] -  Assistance  Unless otherwise directed by your provider, please contact [facility], our nurse care line that is available for  assistance.     Registered nurses in the [facility] provide: appointment scheduling, clinical advisement, health education, and other advisory services.  Call: [phone] (toll free)               Medications           Message regarding Medications     Verify the changes and/or additions to your medication regime listed below are the same as discussed with your clinician today.  If any of these changes or additions are incorrect, please notify your healthcare provider.        CHANGE how you are taking these medications     Start Taking Instead of    metoprolol  succinate (TOPROL-XL) 100 MG 24 hr tablet metoprolol succinate (TOPROL-XL) 100 MG 24 hr tablet    Dosage:  Take 1 tablet (100 mg total) by mouth once daily. Dosage:  TAKE ONE TABLET BY MOUTH DAILY    Reason for Change:  Reorder       STOP taking these medications     MULTAQ 400 mg Tab TAKE 1 TABLET (400 MG TOTAL)  BY MOUTH 2 (TWO) TIMES DAILY.           Verify that the below list of medications is an accurate representation of the medications you are currently taking.  If none reported, the list may be blank. If incorrect, please contact your healthcare provider. Carry this list with you in case of emergency.           Current Medications     albuterol 90 mcg/actuation inhaler Inhale 2 puffs into the lungs every 6 (six) hours as needed for Wheezing.    budesonide-formoterol 80-4.5 mcg (SYMBICORT) 80-4.5 mcg/actuation HFAA Inhale 2 puffs into the lungs 2 (two) times daily.    multivitamin capsule Take 1 capsule by mouth once daily.    alprazolam (XANAX) 0.25 MG tablet TAKE ONE TABLET BY MOUTH TWICE A DAY AS NEEDED FOR ANXIETY    atorvastatin (LIPITOR) 10 MG tablet TAKE ONE TABLET BY MOUTH ONCE DAILY    b complex vitamins capsule Take 1 capsule by mouth once daily.    CALCIUM CARBONATE/VITAMIN D3 (VITAMIN D-3 ORAL) Take by mouth once daily.    cyanocobalamin (VITAMIN B-12) 1000 MCG tablet Every day    furosemide (LASIX) 20 MG tablet TAKE ONE TABLET BY MOUTH ONCE DAILY    JANTOVEN 5 mg tablet TAKE ONE TABLET BY MOUTH IN THE EVENING    losartan (COZAAR) 100 MG tablet TAKE ONE TABLET BY MOUTH DAILY    metoprolol succinate (TOPROL-XL) 100 MG 24 hr tablet Take 1 tablet (100 mg total) by mouth once daily.    omeprazole (PRILOSEC) 20 MG capsule Take 20 mg by mouth once daily.      potassium chloride (MICRO-K) 10 MEQ CpSR Take 1 capsule (10 mEq total) by mouth every Mon, Wed, Fri.    predniSONE (DELTASONE) 20 MG tablet Take 1 tablet (20 mg total) by mouth once daily.           Clinical Reference Information           Your  Vitals Were     BP Pulse Temp Weight SpO2 BMI    122/80 (BP Location: Left arm) 70 98.7 °F (37.1 °C) (Oral) 61.8 kg (136 lb 3.9 oz) 88% 24.92 kg/m2      Blood Pressure          Most Recent Value    BP  122/80      Allergies as of 4/19/2017     Ciprofloxacin    Iodine    Metronidazole Hcl    Amoxicillin    Epinephrine    Lidocaine    Penicillins    Sulfa (Sulfonamide Antibiotics)      Immunizations Administered on Date of Encounter - 4/19/2017     None      Language Assistance Services     ATTENTION: Language assistance services are available, free of charge. Please call [phone].      ATENCIÓN: Si habla nanetteañol, tiene a mcelroy disposición servicios gratuitos de asistencia lingüística. Llame al [phone].     CHÚ Ý: N?u b?n nói Ti?ng Vi?t, có các d?ch v? h? tr? ngôn ng? mi?n phí dành cho b?n. G?i s? [phone].         [facility] complies with applicable Federal civil rights laws and does not discriminate on the basis of race, color, national origin, age, disability, or sex.

## 2017-04-25 ENCOUNTER — CLINICAL SUPPORT (OUTPATIENT)
Dept: CARDIOLOGY | Facility: CLINIC | Age: 82
End: 2017-04-25
Payer: MEDICARE

## 2017-04-25 ENCOUNTER — ANTI-COAG VISIT (OUTPATIENT)
Dept: CARDIOLOGY | Facility: CLINIC | Age: 82
End: 2017-04-25
Payer: MEDICARE

## 2017-04-25 ENCOUNTER — OFFICE VISIT (OUTPATIENT)
Dept: CARDIOLOGY | Facility: CLINIC | Age: 82
End: 2017-04-25
Payer: MEDICARE

## 2017-04-25 ENCOUNTER — HOSPITAL ENCOUNTER (OUTPATIENT)
Dept: RADIOLOGY | Facility: HOSPITAL | Age: 82
Discharge: HOME | End: 2017-04-25
Attending: INTERNAL MEDICINE
Payer: MEDICARE

## 2017-04-25 VITALS
WEIGHT: 134.69 LBS | SYSTOLIC BLOOD PRESSURE: 146 MMHG | DIASTOLIC BLOOD PRESSURE: 80 MMHG | HEIGHT: 62 IN | BODY MASS INDEX: 24.78 KG/M2 | HEART RATE: 83 BPM

## 2017-04-25 DIAGNOSIS — Z95.818 STATUS POST PLACEMENT OF IMPLANTABLE LOOP RECORDER: ICD-10-CM

## 2017-04-25 DIAGNOSIS — N18.30 CKD (CHRONIC KIDNEY DISEASE) STAGE 3, GFR 30-59 ML/MIN: ICD-10-CM

## 2017-04-25 DIAGNOSIS — I48.0 PAF (PAROXYSMAL ATRIAL FIBRILLATION): ICD-10-CM

## 2017-04-25 DIAGNOSIS — I63.9 CEREBROVASCULAR ACCIDENT (CVA), UNSPECIFIED MECHANISM: ICD-10-CM

## 2017-04-25 DIAGNOSIS — R06.02 SHORTNESS OF BREATH: ICD-10-CM

## 2017-04-25 DIAGNOSIS — Z79.01 LONG TERM (CURRENT) USE OF ANTICOAGULANTS: Primary | ICD-10-CM

## 2017-04-25 DIAGNOSIS — I25.10 CORONARY ARTERY DISEASE, ANGINA PRESENCE UNSPECIFIED, UNSPECIFIED VESSEL OR LESION TYPE, UNSPECIFIED WHETHER NATIVE OR TRANSPLANTED HEART: Primary | ICD-10-CM

## 2017-04-25 DIAGNOSIS — I10 ESSENTIAL HYPERTENSION: ICD-10-CM

## 2017-04-25 LAB — INR PPP: 1.9 (ref 2–3)

## 2017-04-25 PROCEDURE — 1160F RVW MEDS BY RX/DR IN RCRD: CPT | Mod: S$GLB,,, | Performed by: INTERNAL MEDICINE

## 2017-04-25 PROCEDURE — 99499 UNLISTED E&M SERVICE: CPT | Mod: S$GLB,,, | Performed by: INTERNAL MEDICINE

## 2017-04-25 PROCEDURE — 1159F MED LIST DOCD IN RCRD: CPT | Mod: S$GLB,,, | Performed by: INTERNAL MEDICINE

## 2017-04-25 PROCEDURE — 99999 PR PBB SHADOW E&M-EST. PATIENT-LVL II: CPT | Mod: PBBFAC,,, | Performed by: INTERNAL MEDICINE

## 2017-04-25 PROCEDURE — 93291 INTERROG DEV EVAL SCRMS IP: CPT | Mod: S$GLB,,, | Performed by: INTERNAL MEDICINE

## 2017-04-25 PROCEDURE — 71020 XR CHEST PA AND LATERAL: CPT | Mod: 26,,, | Performed by: RADIOLOGY

## 2017-04-25 PROCEDURE — 85610 PROTHROMBIN TIME: CPT | Mod: QW,S$GLB,,

## 2017-04-25 PROCEDURE — 71020 XR CHEST PA AND LATERAL: CPT | Mod: TC,PO

## 2017-04-25 PROCEDURE — 1126F AMNT PAIN NOTED NONE PRSNT: CPT | Mod: S$GLB,,, | Performed by: INTERNAL MEDICINE

## 2017-04-25 PROCEDURE — 99211 OFF/OP EST MAY X REQ PHY/QHP: CPT | Mod: 25,S$GLB,,

## 2017-04-25 NOTE — PROGRESS NOTES
Patient confirms dose and compliance. She is currently waiting for an appointment with pulmonologist regarding possible pulmonary fibrosis diagnosis. multaq was discontinued about a week ago. She is seeing Dr. Alvarado today. Will increase dose and recheck in 2 weeks.

## 2017-04-25 NOTE — MR AVS SNAPSHOT
[facility]  [address] [facility] [address]  [facility] [address]  Phone: [phone]                  Rody Avalos   2017 9:45 AM   Anti-coag visit    Description:  Female : 1930   Provider:  Katia Mixon PharmD   Department:  [facility] Coumadin           Diagnoses this Visit        Comments    Long term (current) use of anticoagulants    -  Primary     Cerebrovascular accident (CVA), unspecified mechanism         PAF (paroxysmal atrial fibrillation)                To Do List           Future Appointments        Provider Department Dept Phone    2017 11:15 AM [facility] XRFL1 [facility] [phone]    2017 1:00 PM Adela Whitney MD [facility] Ophthalmology [phone]    2017 2:00 PM Katia Mixon PharmD [facility] Coumadin [phone]    6/15/2017 2:00 PM Stan Guan DPM [facility] Podiatry [phone]      Goals (5 Years of Data)     None      [facility]     [facility] Nurse Care Line -  Assistance  Unless otherwise directed by your provider, please contact [facility], our nurse care line that is available for  assistance.     Registered nurses in the [facility] provide: appointment scheduling, clinical advisement, health education, and other advisory services.  Call: [phone] (toll free)               Medications           Message regarding Medications     Verify the changes and/or additions to your medication regime listed below are the same as discussed with your clinician today.  If any of these changes or additions are incorrect, please notify your healthcare provider.             Verify that the below list of medications is an accurate representation of the medications you are currently taking.  If none reported, the list may be blank. If incorrect, please contact your healthcare provider. Carry this list with you in case of emergency.           Current Medications     alprazolam (XANAX) 0.25 MG  tablet TAKE ONE TABLET BY MOUTH TWICE A DAY AS NEEDED FOR ANXIETY    atorvastatin (LIPITOR) 10 MG tablet TAKE ONE TABLET BY MOUTH ONCE DAILY    b complex vitamins capsule Take 1 capsule by mouth once daily.    CALCIUM CARBONATE/VITAMIN D3 (VITAMIN D-3 ORAL) Take by mouth once daily.    cyanocobalamin (VITAMIN B-12) 1000 MCG tablet Every day    furosemide (LASIX) 20 MG tablet TAKE ONE TABLET BY MOUTH ONCE DAILY    JANTOVEN 5 mg tablet TAKE ONE TABLET BY MOUTH IN THE EVENING    losartan (COZAAR) 100 MG tablet TAKE ONE TABLET BY MOUTH DAILY    metoprolol succinate (TOPROL-XL) 100 MG 24 hr tablet Take 1 tablet (100 mg total) by mouth once daily.    multivitamin capsule Take 1 capsule by mouth once daily.    omeprazole (PRILOSEC) 20 MG capsule Take 20 mg by mouth once daily.      potassium chloride (MICRO-K) 10 MEQ CpSR Take 1 capsule (10 mEq total) by mouth every Mon, Wed, Fri.           Clinical Reference Information           Allergies as of 4/25/2017     Ciprofloxacin    Iodine    Metronidazole Hcl    Amoxicillin    Epinephrine    Lidocaine    Penicillins    Sulfa (Sulfonamide Antibiotics)      Immunizations Administered on Date of Encounter - 4/25/2017     None      Orders Placed During Today's Visit      Normal Orders This Visit    POCT INR          4/25/2017  9:57 AM - Jaydon MarshD      Component Results     Component Value Flag Ref Range Units Status    INR 1.9 (A) 2.0 - 3.0  Final      March 2017 Details    Sun Mon Tue Wed Thu Fri Sat        1               2               3               4                 5               6               7               8               9               10               11                 12               13               14               15               16               17               18                 19               20               21               22               23               24               25                 26      2.5 mg         27      2.5 mg          28   2.4   2.5 mg   See details      29      2.5 mg         30      2.5 mg         31      2.5 mg           Date Details   03/28 Last INR check   INR: 2.4                 April 2017 Details    Sun Mon Tue Wed Thu Fri Sat           1      2.5 mg           2      2.5 mg         3      2.5 mg         4      2.5 mg         5      2.5 mg         6      2.5 mg         7      2.5 mg         8      2.5 mg           9      2.5 mg         10      2.5 mg         11      2.5 mg         12      2.5 mg         13      2.5 mg         14      2.5 mg         15      2.5 mg           16      2.5 mg         17      2.5 mg         18      2.5 mg         19      2.5 mg         20      2.5 mg         21      2.5 mg         22      2.5 mg           23      2.5 mg         24      2.5 mg         25   1.9   5 mg   See details      26      2.5 mg         27      2.5 mg         28      2.5 mg         29      2.5 mg           30      2.5 mg                Date Details   04/25 This INR check   INR: 1.9                     How to take your warfarin dose     To take:  2.5 mg Take 0.5 of a 5 mg tablet.    To take:  5 mg Take 1 of the 5 mg tablets.           May 2017 Details    Sun Mon Tue Wed Thu Fri Sat      1      2.5 mg         2      5 mg         3      2.5 mg         4      2.5 mg         5      2.5 mg         6      2.5 mg           7      2.5 mg         8      2.5 mg         9            10               11               12               13                 14               15               16               17               18               19               20                 21               22               23               24               25               26               27                 28               29               30               31                   Date Details   No additional details    Date of next INR:  5/9/2017         How to take your warfarin dose     To take:  2.5 mg Take 0.5 of a 5 mg tablet.    To take:  5 mg Take 1  of the 5 mg tablets.           Anticoagulation Summary as of 4/25/2017     Maintenance plan 5 mg (5 mg x 1) on Tue; 2.5 mg (5 mg x 0.5) all other days    Full instructions 5 mg on Tue; 2.5 mg all other days    Next INR check 5/9/2017      Anticoagulation Episode Summary     Comments [facility] CVA 10/20/13--h/o CHF 4/15 no DDI with doxy pt has Jantoven vitk 1x/wk      Language Assistance Services     ATTENTION: Language assistance services are available, free of charge. Please call [phone].      ATENCIÓN: Si habla español, tiene a mcelroy disposición servicios gratuitos de asistencia lingüística. Llame al [phone].     CHÚ Ý: N?u b?n nói Ti?ng Vi?t, có các d?ch v? h? tr? ngôn ng? mi?n phí dành cho b?n. G?i s? [phone].         [facility] complies with applicable Federal civil rights laws and does not discriminate on the basis of race, color, national origin, age, disability, or sex.

## 2017-04-25 NOTE — PROGRESS NOTES
Subjective:    Patient ID:  Rody Avalos is a 87 y.o. female who presents for follow-up of Shortness of Breath      HPI  She comes with persistent shortness of breath with minimal activity. Recent CT of chest c/w fibrosis. Multaq stopped  She's been on persistent atrial fibrillation for the last year.  appt with dr Asher next week    Review of Systems   Constitution: Negative for decreased appetite, weakness, malaise/fatigue, weight gain and weight loss.   Cardiovascular: Positive for dyspnea on exertion. Negative for chest pain, leg swelling, palpitations and syncope.   Respiratory: Negative for cough and shortness of breath.    Gastrointestinal: Negative.    All other systems reviewed and are negative.       Objective:    Physical Exam   Constitutional: She is oriented to person, place, and time. She appears well-developed and well-nourished.   HENT:   Head: Normocephalic.   Eyes: Pupils are equal, round, and reactive to light.   Neck: Normal range of motion. Neck supple. No JVD present. Carotid bruit is not present. No thyromegaly present.   Cardiovascular: Normal rate, intact distal pulses and normal pulses.  An irregularly irregular rhythm present. PMI is not displaced.  Exam reveals no gallop.    Murmur heard.  High-pitched blowing holosystolic murmur is present  at the apex  Pulmonary/Chest: Effort normal and breath sounds normal.   Abdominal: Soft. Normal appearance. She exhibits no mass. There is no hepatosplenomegaly. There is no tenderness.   Musculoskeletal: Normal range of motion. She exhibits no edema.   Neurological: She is alert and oriented to person, place, and time. She has normal strength and normal reflexes. No sensory deficit.   Skin: Skin is warm and intact.   Psychiatric: She has a normal mood and affect.   Nursing note and vitals reviewed.        Assessment:       1. Coronary artery disease, angina presence unspecified, unspecified vessel or lesion type, unspecified whether native or  transplanted heart    2. Essential hypertension    3. PAF (paroxysmal atrial fibrillation)    4. CKD (chronic kidney disease) stage 3, GFR 30-59 ml/min    5. Cerebrovascular accident (CVA), unspecified mechanism         Plan:   CCFD; noreen with results  Continue all cardiac medications  6 weeks f/u

## 2017-04-26 ENCOUNTER — CLINICAL SUPPORT (OUTPATIENT)
Dept: CARDIOLOGY | Facility: CLINIC | Age: 82
End: 2017-04-26
Payer: MEDICARE

## 2017-04-26 DIAGNOSIS — N18.30 CKD (CHRONIC KIDNEY DISEASE) STAGE 3, GFR 30-59 ML/MIN: ICD-10-CM

## 2017-04-26 DIAGNOSIS — I10 ESSENTIAL HYPERTENSION: ICD-10-CM

## 2017-04-26 DIAGNOSIS — I63.9 CEREBROVASCULAR ACCIDENT (CVA), UNSPECIFIED MECHANISM: ICD-10-CM

## 2017-04-26 DIAGNOSIS — I48.0 PAF (PAROXYSMAL ATRIAL FIBRILLATION): ICD-10-CM

## 2017-04-26 DIAGNOSIS — I25.10 CORONARY ARTERY DISEASE, ANGINA PRESENCE UNSPECIFIED, UNSPECIFIED VESSEL OR LESION TYPE, UNSPECIFIED WHETHER NATIVE OR TRANSPLANTED HEART: ICD-10-CM

## 2017-04-26 PROCEDURE — 93306 TTE W/DOPPLER COMPLETE: CPT | Mod: S$GLB,,, | Performed by: INTERNAL MEDICINE

## 2017-04-27 LAB
ESTIMATED PA SYSTOLIC PRESSURE: 82.57
MITRAL VALVE MOBILITY: NORMAL
MITRAL VALVE REGURGITATION: ABNORMAL
RETIRED EF AND QEF - SEE NOTES: 50 (ref 55–65)
TRICUSPID VALVE REGURGITATION: ABNORMAL

## 2017-05-04 RX ORDER — LOSARTAN POTASSIUM 100 MG/1
TABLET ORAL
Qty: 90 TABLET | Refills: 3 | Status: SHIPPED | OUTPATIENT
Start: 2017-05-04

## 2017-05-08 ENCOUNTER — TELEPHONE (OUTPATIENT)
Dept: OPHTHALMOLOGY | Facility: CLINIC | Age: 82
End: 2017-05-08

## 2017-05-08 NOTE — TELEPHONE ENCOUNTER
----- Message from Tom Boudreaux sent at 5/8/2017 10:35 AM CDT -----  Contact: Patient  Patient called requesting a later appointment in the day. I tried to reschedule appointment. Patient declined. Please call back at [phone]. Thanks,

## 2017-05-15 ENCOUNTER — ANTI-COAG VISIT (OUTPATIENT)
Dept: CARDIOLOGY | Facility: CLINIC | Age: 82
End: 2017-05-15
Payer: MEDICARE

## 2017-05-15 DIAGNOSIS — I48.0 PAF (PAROXYSMAL ATRIAL FIBRILLATION): ICD-10-CM

## 2017-05-15 DIAGNOSIS — I63.9 CEREBROVASCULAR ACCIDENT (CVA), UNSPECIFIED MECHANISM: ICD-10-CM

## 2017-05-15 DIAGNOSIS — Z79.01 LONG TERM (CURRENT) USE OF ANTICOAGULANTS: Primary | ICD-10-CM

## 2017-05-15 LAB — INR PPP: 2.1 (ref 2–3)

## 2017-05-15 PROCEDURE — 99211 OFF/OP EST MAY X REQ PHY/QHP: CPT | Mod: 25,S$GLB,, | Performed by: PHARMACIST

## 2017-05-15 PROCEDURE — 85610 PROTHROMBIN TIME: CPT | Mod: QW,S$GLB,, | Performed by: PHARMACIST

## 2017-05-18 ENCOUNTER — ANTI-COAG VISIT (OUTPATIENT)
Dept: CARDIOLOGY | Facility: CLINIC | Age: 82
End: 2017-05-18
Payer: MEDICARE

## 2017-05-18 DIAGNOSIS — I48.0 PAF (PAROXYSMAL ATRIAL FIBRILLATION): ICD-10-CM

## 2017-05-18 DIAGNOSIS — I63.9 CEREBROVASCULAR ACCIDENT (CVA), UNSPECIFIED MECHANISM: ICD-10-CM

## 2017-05-18 DIAGNOSIS — Z79.01 LONG TERM (CURRENT) USE OF ANTICOAGULANTS: Primary | ICD-10-CM

## 2017-05-18 LAB — INR PPP: 2.7 (ref 2–3)

## 2017-05-18 PROCEDURE — 99211 OFF/OP EST MAY X REQ PHY/QHP: CPT | Mod: 25,S$GLB,, | Performed by: PHARMACIST

## 2017-05-18 PROCEDURE — 85610 PROTHROMBIN TIME: CPT | Mod: QW,S$GLB,, | Performed by: PHARMACIST

## 2017-05-18 NOTE — PROGRESS NOTES
INR has increased some since prednisone started, does not seem to be having too much effect however.  I am lowering dose a small amount, she will start the lower/maintanence dose of prednisone in 5 days.  Will recheck INR in 10 days

## 2017-05-18 NOTE — MR AVS SNAPSHOT
[facility]  [address] [facility] [address]  [facility] [address]  Phone: [phone]                  Rody Avalos   2017 10:45 AM   Anti-coag visit    Description:  Female : 1930   Provider:  Joi Sifuentes, PharmD   Department:  [facility]           Diagnoses this Visit        Comments    Long term (current) use of anticoagulants    -  Primary     Cerebrovascular accident (CVA), unspecified mechanism         PAF (paroxysmal atrial fibrillation)                To Do List           Future Appointments        Provider Department Dept Phone    2017 1:15 PM Joi Sifuentes, PharmD [facility] Coumadin [phone]    2017 9:45 AM Aldo Alvarado MD [facility] Cardiology [phone]    2017 1:00 PM Adlea Whitney MD [facility] Ophthalmology [phone]    6/15/2017 2:00 PM Stan Guan DPM [facility] Podiatry [phone]      Goals (5 Years of Data)     None      [facility]     [facility] Care [facility] -  Assistance  Unless otherwise directed by your provider, please contact [facility], our nurse care line that is available for  assistance.     Registered nurses in the [facility] provide: appointment scheduling, clinical advisement, health education, and other advisory services.  Call: [phone] (toll free)               Medications           Message regarding Medications     Verify the changes and/or additions to your medication regime listed below are the same as discussed with your clinician today.  If any of these changes or additions are incorrect, please notify your healthcare provider.             Verify that the below list of medications is an accurate representation of the medications you are currently taking.  If none reported, the list may be blank. If incorrect, please contact your healthcare provider. Carry this list with you in case of emergency.           Current Medications     alprazolam  (XANAX) 0.25 MG tablet TAKE ONE TABLET BY MOUTH TWICE A DAY AS NEEDED FOR ANXIETY    atorvastatin (LIPITOR) 10 MG tablet TAKE ONE TABLET BY MOUTH ONCE DAILY    b complex vitamins capsule Take 1 capsule by mouth once daily.    CALCIUM CARBONATE/VITAMIN D3 (VITAMIN D-3 ORAL) Take by mouth once daily.    cyanocobalamin (VITAMIN B-12) 1000 MCG tablet Every day    furosemide (LASIX) 20 MG tablet TAKE ONE TABLET BY MOUTH ONCE DAILY    JANTOVEN 5 mg tablet TAKE ONE TABLET BY MOUTH IN THE EVENING    losartan (COZAAR) 100 MG tablet TAKE ONE TABLET BY MOUTH ONCE DAILY    metoprolol succinate (TOPROL-XL) 100 MG 24 hr tablet Take 1 tablet (100 mg total) by mouth once daily.    multivitamin capsule Take 1 capsule by mouth once daily.    omeprazole (PRILOSEC) 20 MG capsule Take 20 mg by mouth once daily.      potassium chloride (MICRO-K) 10 MEQ CpSR Take 1 capsule (10 mEq total) by mouth every Mon, Wed, Fri.           Clinical Reference Information           Allergies as of 5/18/2017     Ciprofloxacin    Iodine    Metronidazole Hcl    Amoxicillin    Epinephrine    Lidocaine    Penicillins    Sulfa (Sulfonamide Antibiotics)      Immunizations Administered on Date of Encounter - 5/18/2017     None      Orders Placed During Today's Visit      Normal Orders This Visit    POCT INR          5/18/2017  3:13 PM - Joi Sifuentes, PharmD      Component Results     Component    INR    2.7      April 2017 Details    Sun Mon Tue Wed Thu Fri Sat           1                 2               3               4               5               6               7               8                 9               10               11               12               13               14               15                 16               17               18      2.5 mg         19      2.5 mg         20      2.5 mg         21      2.5 mg         22      2.5 mg           23      2.5 mg         24      2.5 mg         25   1.9   5 mg   See details      26      2.5  mg         27      2.5 mg         28      2.5 mg         29      2.5 mg           30      2.5 mg                Date Details   04/25 INR: 1.9                 May 2017 Details    Sun Mon Tue Wed Thu Fri Sat      1      2.5 mg         2      5 mg         3      2.5 mg         4      2.5 mg         5      2.5 mg         6      2.5 mg           7      2.5 mg         8      2.5 mg         9      5 mg         10      2.5 mg         11      2.5 mg         12      2.5 mg         13      2.5 mg           14      2.5 mg         15   2.1   2.5 mg   See details      16      5 mg         17      2.5 mg         18   2.7   2.5 mg   See details      19      2.5 mg         20      2.5 mg           21      2.5 mg         22      2.5 mg         23      2.5 mg         24      2.5 mg         25      2.5 mg         26      2.5 mg         27      2.5 mg           28      2.5 mg         29            30               31                   Date Details   05/15 Last INR check   INR: 2.1      05/18 This INR check   INR: 2.7       Date of next INR:  5/29/2017               How to take your warfarin dose     To take:  2.5 mg Take 0.5 of a 5 mg tablet.           Anticoagulation Summary as of 5/18/2017     Maintenance plan 2.5 mg (5 mg x 0.5) every day    Full instructions 2.5 mg every day    Next INR check 5/29/2017      Anticoagulation Episode Summary     Comments [facility] CVA 10/20/13--h/o CHF 4/15 no DDI with doxy pt has Jantoven vitk 1x/wk 5/17 mild DDI prednisone      [facility] Sign-Up     Activating your [facility] account is as easy as 1-2-3!     1) Visit [email or web address], select Sign Up Now, enter this activation code and your date of birth, then select Next.  Activation code not generated  Current Patient Portal Status: Account disabled      2) Create a username and password to use when you visit [facility] in the future and select a security question in case you lose your password and select Next.    3) Enter your e-mail address and click Sign  Up!    Additional Information  If you have questions, please e-mail [email or web address] or call [phone] to talk to our [facility] staff. Remember, [facility] is NOT to be used for urgent needs. For medical emergencies, dial 911.         Language Assistance Services     ATTENTION: Language assistance services are available, free of charge. Please call [phone].      ATENCIÓN: Si habla español, tiene a mcelroy disposición servicios gratuitos de asistencia lingüística. Llame al [phone].     [facility] Ý: N?u b?n nói Ti?ng Vi?t, có các d?ch v? h? tr? ngôn ng? mi?n phí dành cho b?n. G?i s? [phone].         [facility] complies with applicable Federal civil rights laws and does not discriminate on the basis of race, color, national origin, age, disability, or sex.

## 2017-05-29 ENCOUNTER — ANTI-COAG VISIT (OUTPATIENT)
Dept: CARDIOLOGY | Facility: CLINIC | Age: 82
End: 2017-05-29
Payer: MEDICARE

## 2017-05-29 DIAGNOSIS — I63.9 CEREBROVASCULAR ACCIDENT (CVA), UNSPECIFIED MECHANISM: ICD-10-CM

## 2017-05-29 DIAGNOSIS — Z79.01 LONG TERM (CURRENT) USE OF ANTICOAGULANTS: Primary | ICD-10-CM

## 2017-05-29 DIAGNOSIS — I48.0 PAF (PAROXYSMAL ATRIAL FIBRILLATION): ICD-10-CM

## 2017-05-29 LAB — INR PPP: 1.8 (ref 2–3)

## 2017-05-29 PROCEDURE — 85610 PROTHROMBIN TIME: CPT | Mod: QW,S$GLB,, | Performed by: PHARMACIST

## 2017-05-29 PROCEDURE — 99211 OFF/OP EST MAY X REQ PHY/QHP: CPT | Mod: 25,S$GLB,, | Performed by: PHARMACIST

## 2017-05-29 NOTE — PROGRESS NOTES
INr just below range today, but is acceptable. Pt reports eating some dark green salad recently, does not normally consume.  She has been on the prednisone 10mg for about a week.  Will continue the dose for now.  Recheck in 2 weeks when she has another appt

## 2017-06-06 ENCOUNTER — OFFICE VISIT (OUTPATIENT)
Dept: CARDIOLOGY | Facility: CLINIC | Age: 82
End: 2017-06-06
Payer: MEDICARE

## 2017-06-06 ENCOUNTER — OFFICE VISIT (OUTPATIENT)
Dept: OPHTHALMOLOGY | Facility: CLINIC | Age: 82
End: 2017-06-06
Payer: MEDICARE

## 2017-06-06 VITALS
DIASTOLIC BLOOD PRESSURE: 83 MMHG | HEIGHT: 62 IN | WEIGHT: 133.63 LBS | SYSTOLIC BLOOD PRESSURE: 144 MMHG | RESPIRATION RATE: 18 BRPM | HEART RATE: 73 BPM | BODY MASS INDEX: 24.59 KG/M2

## 2017-06-06 DIAGNOSIS — J84.10 PULMONARY FIBROSIS: ICD-10-CM

## 2017-06-06 DIAGNOSIS — I25.9 ISCHEMIC HEART DISEASE DUE TO CORONARY ARTERY OBSTRUCTION: Primary | ICD-10-CM

## 2017-06-06 DIAGNOSIS — I48.0 PAF (PAROXYSMAL ATRIAL FIBRILLATION): Primary | ICD-10-CM

## 2017-06-06 DIAGNOSIS — I27.20 PULMONARY HYPERTENSION: ICD-10-CM

## 2017-06-06 DIAGNOSIS — I34.0 MITRAL VALVE INSUFFICIENCY, UNSPECIFIED ETIOLOGY: ICD-10-CM

## 2017-06-06 DIAGNOSIS — I24.0 ISCHEMIC HEART DISEASE DUE TO CORONARY ARTERY OBSTRUCTION: Primary | ICD-10-CM

## 2017-06-06 DIAGNOSIS — H52.203 MYOPIA WITH ASTIGMATISM AND PRESBYOPIA, BILATERAL: ICD-10-CM

## 2017-06-06 DIAGNOSIS — H52.13 MYOPIA WITH ASTIGMATISM AND PRESBYOPIA, BILATERAL: ICD-10-CM

## 2017-06-06 DIAGNOSIS — H52.4 MYOPIA WITH ASTIGMATISM AND PRESBYOPIA, BILATERAL: ICD-10-CM

## 2017-06-06 DIAGNOSIS — H43.813 PVD (POSTERIOR VITREOUS DETACHMENT), BILATERAL: ICD-10-CM

## 2017-06-06 DIAGNOSIS — Z96.1 PSEUDOPHAKIA: ICD-10-CM

## 2017-06-06 DIAGNOSIS — I25.10 CORONARY ARTERY DISEASE, ANGINA PRESENCE UNSPECIFIED, UNSPECIFIED VESSEL OR LESION TYPE, UNSPECIFIED WHETHER NATIVE OR TRANSPLANTED HEART: ICD-10-CM

## 2017-06-06 DIAGNOSIS — N18.30 CKD (CHRONIC KIDNEY DISEASE) STAGE 3, GFR 30-59 ML/MIN: ICD-10-CM

## 2017-06-06 DIAGNOSIS — I48.0 PAF (PAROXYSMAL ATRIAL FIBRILLATION): ICD-10-CM

## 2017-06-06 DIAGNOSIS — D31.31 CHOROIDAL NEVUS, RIGHT: Primary | ICD-10-CM

## 2017-06-06 DIAGNOSIS — E78.5 HYPERLIPIDEMIA, UNSPECIFIED HYPERLIPIDEMIA TYPE: ICD-10-CM

## 2017-06-06 PROCEDURE — 1159F MED LIST DOCD IN RCRD: CPT | Mod: S$GLB,,, | Performed by: INTERNAL MEDICINE

## 2017-06-06 PROCEDURE — 99999 PR PBB SHADOW E&M-EST. PATIENT-LVL III: CPT | Mod: PBBFAC,,, | Performed by: OPHTHALMOLOGY

## 2017-06-06 PROCEDURE — 92014 COMPRE OPH EXAM EST PT 1/>: CPT | Mod: S$GLB,,, | Performed by: OPHTHALMOLOGY

## 2017-06-06 PROCEDURE — 99214 OFFICE O/P EST MOD 30 MIN: CPT | Mod: S$GLB,,, | Performed by: INTERNAL MEDICINE

## 2017-06-06 PROCEDURE — 99999 PR PBB SHADOW E&M-EST. PATIENT-LVL II: CPT | Mod: PBBFAC,,, | Performed by: INTERNAL MEDICINE

## 2017-06-06 PROCEDURE — 99499 UNLISTED E&M SERVICE: CPT | Mod: S$GLB,,, | Performed by: INTERNAL MEDICINE

## 2017-06-06 PROCEDURE — 99499 UNLISTED E&M SERVICE: CPT | Mod: S$GLB,,, | Performed by: OPHTHALMOLOGY

## 2017-06-06 RX ORDER — PREDNISONE 10 MG/1
10 TABLET ORAL DAILY
COMMUNITY
End: 2017-06-15 | Stop reason: ALTCHOICE

## 2017-06-06 RX ORDER — PREDNISONE 5 MG/1
2.5 TABLET ORAL DAILY
COMMUNITY
End: 2017-07-10 | Stop reason: SDUPTHER

## 2017-06-06 NOTE — PROGRESS NOTES
HPI     Eye Problem    Additional comments: trouble focusing after takes glasses off x since   2015 when purchased new glasses..           Comments   No blurred va //no flashes or floaters noted by the pt// pt states when   she takes off her glasses it takes more time to focus x since wearing new   glasses//       Last edited by Lona Trivedi on 6/6/2017 10:44 AM. (History)        ROS     Positive for: Gastrointestinal (GI hemorrhage), Neurological (CVA),   Cardiovascular (hx stents, hx a-fib; HTN controlled per pt), Eyes,   Respiratory (SOB), Heme/Lymph (ASA and Coumadin)    Negative for: Endocrine (denies DM), Allergic/Imm    Last edited by Adela Whitney MD on 6/6/2017 11:47 AM.   (History)        Assessment /Plan     For exam results, see Encounter Report.    Choroidal nevus, right    PVD (posterior vitreous detachment), bilateral    Pseudophakia    Myopia with astigmatism and presbyopia, bilateral            Choroidal nevi - Right Eye - stable. F/u 1 year   PVD OU - RD precautions   Pseudophakia - stable   Myopia with astigmatism and presbyopia - stable, declines MRx

## 2017-06-06 NOTE — PROGRESS NOTES
Subjective:    Patient ID:  Rody Avalos is a 87 y.o. female who presents for follow-up of atrial fibrillation    HPI  She comes with persistent shortness of breath with minimal activity  Seeing dr Asher for pulmonary fibrosis/hypertension    Review of Systems   Constitution: Negative for decreased appetite, weakness, malaise/fatigue, weight gain and weight loss.   Cardiovascular: Positive for dyspnea on exertion. Negative for chest pain, leg swelling, palpitations and syncope.   Respiratory: Positive for shortness of breath. Negative for cough.    Gastrointestinal: Negative.    All other systems reviewed and are negative.       Objective:    Physical Exam   Constitutional: She is oriented to person, place, and time. She appears well-developed and well-nourished.   HENT:   Head: Normocephalic.   Eyes: Pupils are equal, round, and reactive to light.   Neck: Normal range of motion. Neck supple. No JVD present. Carotid bruit is not present. No thyromegaly present.   Cardiovascular: Normal rate, normal heart sounds, intact distal pulses and normal pulses.  An irregularly irregular rhythm present. PMI is not displaced.  Exam reveals no gallop.    No murmur heard.  Pulmonary/Chest: Effort normal and breath sounds normal.   Abdominal: Soft. Normal appearance. She exhibits no mass. There is no hepatosplenomegaly. There is no tenderness.   Musculoskeletal: Normal range of motion. She exhibits no edema.   Neurological: She is alert and oriented to person, place, and time. She has normal strength and normal reflexes. No sensory deficit.   Skin: Skin is warm and intact.   Psychiatric: She has a normal mood and affect.   Nursing note and vitals reviewed.        Assessment:       1. 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 Coronary artery disease, angina presence unspecified, unspecified vessel or lesion type, unspecified whether native or transplanted heart    3. Mitral valve insufficiency,  unspecified etiology    4. PAF (paroxysmal atrial fibrillation)    5. Pulmonary hypertension    6. Pulmonary fibrosis    7. CKD (chronic kidney disease) stage 3, GFR 30-59 ml/min    8. Hyperlipidemia, unspecified hyperlipidemia type         Plan:     Continue all cardiac medications  Regular exercise program  6 m f/u

## 2017-06-15 ENCOUNTER — ANTI-COAG VISIT (OUTPATIENT)
Dept: CARDIOLOGY | Facility: CLINIC | Age: 82
End: 2017-06-15
Payer: MEDICARE

## 2017-06-15 ENCOUNTER — OFFICE VISIT (OUTPATIENT)
Dept: PODIATRY | Facility: CLINIC | Age: 82
End: 2017-06-15
Payer: MEDICARE

## 2017-06-15 VITALS — HEIGHT: 62 IN | BODY MASS INDEX: 25.15 KG/M2 | WEIGHT: 136.69 LBS

## 2017-06-15 DIAGNOSIS — M20.40 HAMMER TOE, UNSPECIFIED LATERALITY: ICD-10-CM

## 2017-06-15 DIAGNOSIS — I48.0 PAF (PAROXYSMAL ATRIAL FIBRILLATION): ICD-10-CM

## 2017-06-15 DIAGNOSIS — B35.1 ONYCHOMYCOSIS DUE TO DERMATOPHYTE: ICD-10-CM

## 2017-06-15 DIAGNOSIS — L84 CORN OR CALLUS: ICD-10-CM

## 2017-06-15 DIAGNOSIS — Z79.01 LONG TERM (CURRENT) USE OF ANTICOAGULANTS: Primary | ICD-10-CM

## 2017-06-15 DIAGNOSIS — I63.9 CEREBROVASCULAR ACCIDENT (CVA), UNSPECIFIED MECHANISM: ICD-10-CM

## 2017-06-15 DIAGNOSIS — G60.9 IDIOPATHIC PERIPHERAL NEUROPATHY: Primary | ICD-10-CM

## 2017-06-15 LAB — INR PPP: 1.9 (ref 2–3)

## 2017-06-15 PROCEDURE — 99999 PR PBB SHADOW E&M-EST. PATIENT-LVL III: CPT | Mod: PBBFAC,,, | Performed by: PODIATRIST

## 2017-06-15 PROCEDURE — 11055 PARING/CUTG B9 HYPRKER LES 1: CPT | Mod: Q9,S$GLB,, | Performed by: PODIATRIST

## 2017-06-15 PROCEDURE — 99499 UNLISTED E&M SERVICE: CPT | Mod: S$GLB,,, | Performed by: PODIATRIST

## 2017-06-15 PROCEDURE — 85610 PROTHROMBIN TIME: CPT | Mod: QW,S$GLB,, | Performed by: PHARMACIST

## 2017-06-15 PROCEDURE — 99211 OFF/OP EST MAY X REQ PHY/QHP: CPT | Mod: 25,S$GLB,, | Performed by: PHARMACIST

## 2017-06-15 PROCEDURE — 11721 DEBRIDE NAIL 6 OR MORE: CPT | Mod: 59,Q9,S$GLB, | Performed by: PODIATRIST

## 2017-06-15 NOTE — PROGRESS NOTES
INr still below range; pt confirms dose.  Pt reports prednisone dose as of today will lower to 2.5mg daily until further notice-sometime in July).  No other changes.  She is seeing podiatry today & will call with changes.  Increase dose and recheck in 3 weeks

## 2017-06-16 NOTE — PROGRESS NOTES
Subjective:      Patient ID: Rody Avalos is a 87 y.o. female.    Chief Complaint: Peripheral Neuropathy (PCP- Dr. Gaona 4/19/17); Callouses (left second toe); and Toe Pain (left second)  Patient presents to clinic for a routine foot exam.  Relates recurrence of painful callus to the tip of the Lt. 2nd toe.  States the lesion emits sharp pain with weight bearing and with pressure.  Currently rates pain as a 4/10.  States symptoms are alleviated with rest.  Has been applying moisturizer regularly, with minimal improvement in her skin.   Denies having N/V/F/C/D.  Denies any additional pedal complaints.     PCP: Dr. Gaona  Last visit: 4/19/17    Review of Systems   Constitution: Negative for chills, decreased appetite, diaphoresis and fever.   Cardiovascular: Negative for claudication and leg swelling.   Skin: Positive for color change, dry skin and nail changes.   Musculoskeletal: Positive for arthritis and joint pain. Negative for back pain and falls.   Neurological: Positive for numbness and paresthesias.   Psychiatric/Behavioral: Negative for altered mental status.           Objective:      Physical Exam   Constitutional: She is oriented to person, place, and time. She appears well-developed and well-nourished. No distress.   Cardiovascular:   Pulses:       Dorsalis pedis pulses are 2+ on the right side, and 2+ on the left side.        Posterior tibial pulses are 1+ on the right side, and 1+ on the left side.   CFT <3 seconds bilateral.  Pedal hair growth decreased bilateral.  Varicosities noted to bilateral lower extremity.  1+ pitting edema noted to bilateral lower extremity.  Toes are cool to touch bilateral.     Musculoskeletal: She exhibits edema and tenderness.   Muscle strength 5/5 in all muscle groups bilateral.  No tenderness nor crepitation with ROM of foot/ankle joints bilateral.  Pain with palpation of the lesion of the Lt. 2nd toe.  Bilateral pes cavus foot type.  Bilateral hallux abducto valgus.   Bilateral semi-rigid contracture of toes 2-5 with overlapping of the Lt.  2nd toe on the 1st.     Neurological: She is alert and oriented to person, place, and time. She has normal strength. A sensory deficit is present.   Protective sensation per [place] monofilament decreased bilateral.    Vibratory sensation decreased bilateral.    Light touch intact bilateral.   Skin: Skin is warm and dry. Lesion noted. No abrasion, no bruising, no burn, no ecchymosis, no laceration, no petechiae and no rash noted. She is not diaphoretic. No cyanosis or erythema. No pallor. Nails show no clubbing.   Pedal skin appears edematous bilateral.  Toenails x 10 appear thickened by 2 mm's, elongated by 2 mm's, and discolored with subungual debris.  Hyperkeratotic lesion noted to the distal tip of the Lt. 2nd toe. Examination of the skin reveals no evidence of significant maceration, rashes, open lesions, suspicious appearing nevi or other concerning lesions.              Assessment:       Encounter Diagnoses   Name Primary?    Idiopathic peripheral neuropathy Yes    Hammer toe, unspecified laterality     Corn or callus     Onychomycosis due to dermatophyte          Plan:       Rody Toledo was seen today for peripheral neuropathy, callouses and toe pain.    Diagnoses and all orders for this visit:    Idiopathic peripheral neuropathy    Hammer toe, unspecified laterality    Corn or callus    Onychomycosis due to dermatophyte      I counseled the patient on her conditions, their implications and medical management.    With the patient's verbal consent, a sterile nail nipper was used to trim toenails x 10 down to their soft tissue attachments without incident.  Also, a sterile #15 scalpel was used to trim the Lt. 2nd toe lesion down to healthy appearing skin without incident.  Patient tolerated this quite well.    Advised to continue moisturizing feet regularly.      Will schedule for Procedure Brief between visits, to prevent  recurrence of ulceration to the Lt. 2nd toe.    Briefly discussed performing a flexor tenotomy of the same digit at her regularly scheduled appointment in 3 months.      Stan Guan DPM

## 2017-06-26 ENCOUNTER — CLINICAL SUPPORT (OUTPATIENT)
Dept: CARDIOLOGY | Facility: CLINIC | Age: 82
End: 2017-06-26
Payer: MEDICARE

## 2017-06-26 DIAGNOSIS — Z95.818 STATUS POST PLACEMENT OF IMPLANTABLE LOOP RECORDER: ICD-10-CM

## 2017-06-26 DIAGNOSIS — I48.0 PAF (PAROXYSMAL ATRIAL FIBRILLATION): ICD-10-CM

## 2017-06-26 PROCEDURE — 93297 REM INTERROG DEV EVAL ICPMS: CPT | Mod: S$GLB,,, | Performed by: INTERNAL MEDICINE

## 2017-06-26 PROCEDURE — 93299 LOOP RECORDER REMOTE: CPT | Mod: S$GLB,,, | Performed by: INTERNAL MEDICINE

## 2017-07-10 ENCOUNTER — ANTI-COAG VISIT (OUTPATIENT)
Dept: CARDIOLOGY | Facility: CLINIC | Age: 82
End: 2017-07-10
Payer: MEDICARE

## 2017-07-10 ENCOUNTER — LAB VISIT (OUTPATIENT)
Dept: LAB | Facility: HOSPITAL | Age: 82
End: 2017-07-10
Attending: FAMILY MEDICINE
Payer: MEDICARE

## 2017-07-10 ENCOUNTER — OFFICE VISIT (OUTPATIENT)
Dept: FAMILY MEDICINE | Facility: CLINIC | Age: 82
End: 2017-07-10
Payer: MEDICARE

## 2017-07-10 VITALS
DIASTOLIC BLOOD PRESSURE: 88 MMHG | SYSTOLIC BLOOD PRESSURE: 148 MMHG | HEART RATE: 68 BPM | TEMPERATURE: 99 F | WEIGHT: 135.25 LBS | BODY MASS INDEX: 24.89 KG/M2 | HEIGHT: 62 IN

## 2017-07-10 DIAGNOSIS — I10 ESSENTIAL HYPERTENSION: ICD-10-CM

## 2017-07-10 DIAGNOSIS — Z79.01 LONG TERM (CURRENT) USE OF ANTICOAGULANTS: Primary | ICD-10-CM

## 2017-07-10 DIAGNOSIS — I25.10 CORONARY ARTERY DISEASE, ANGINA PRESENCE UNSPECIFIED, UNSPECIFIED VESSEL OR LESION TYPE, UNSPECIFIED WHETHER NATIVE OR TRANSPLANTED HEART: ICD-10-CM

## 2017-07-10 DIAGNOSIS — I48.0 PAF (PAROXYSMAL ATRIAL FIBRILLATION): ICD-10-CM

## 2017-07-10 DIAGNOSIS — I63.9 CEREBROVASCULAR ACCIDENT (CVA), UNSPECIFIED MECHANISM: ICD-10-CM

## 2017-07-10 DIAGNOSIS — J84.10 PULMONARY FIBROSIS: Primary | ICD-10-CM

## 2017-07-10 LAB
ALBUMIN SERPL BCP-MCNC: 3.2 G/DL
ALP SERPL-CCNC: 103 U/L
ALT SERPL W/O P-5'-P-CCNC: 18 U/L
ANION GAP SERPL CALC-SCNC: 11 MMOL/L
AST SERPL-CCNC: 27 U/L
BILIRUB SERPL-MCNC: 0.8 MG/DL
BUN SERPL-MCNC: 15 MG/DL
CALCIUM SERPL-MCNC: 9.1 MG/DL
CHLORIDE SERPL-SCNC: 106 MMOL/L
CO2 SERPL-SCNC: 18 MMOL/L
CREAT SERPL-MCNC: 0.9 MG/DL
ERYTHROCYTE [DISTWIDTH] IN BLOOD BY AUTOMATED COUNT: 17.9 %
EST. GFR  (AFRICAN AMERICAN): >60 ML/MIN/1.73 M^2
EST. GFR  (NON AFRICAN AMERICAN): 57.7 ML/MIN/1.73 M^2
GLUCOSE SERPL-MCNC: 138 MG/DL
HCT VFR BLD AUTO: 44.6 %
HGB BLD-MCNC: 14.3 G/DL
INR PPP: 2 (ref 2–3)
MCH RBC QN AUTO: 29.6 PG
MCHC RBC AUTO-ENTMCNC: 32.1 %
MCV RBC AUTO: 92 FL
PLATELET # BLD AUTO: 274 K/UL
PMV BLD AUTO: 10.5 FL
POTASSIUM SERPL-SCNC: 4.9 MMOL/L
PROT SERPL-MCNC: 6.9 G/DL
RBC # BLD AUTO: 4.83 M/UL
SODIUM SERPL-SCNC: 135 MMOL/L
WBC # BLD AUTO: 7.47 K/UL

## 2017-07-10 PROCEDURE — 85610 PROTHROMBIN TIME: CPT | Mod: QW,S$GLB,, | Performed by: PHARMACIST

## 2017-07-10 PROCEDURE — 99999 PR PBB SHADOW E&M-EST. PATIENT-LVL III: CPT | Mod: PBBFAC,,, | Performed by: FAMILY MEDICINE

## 2017-07-10 PROCEDURE — 1159F MED LIST DOCD IN RCRD: CPT | Mod: S$GLB,,, | Performed by: FAMILY MEDICINE

## 2017-07-10 PROCEDURE — 99211 OFF/OP EST MAY X REQ PHY/QHP: CPT | Mod: 25,S$GLB,, | Performed by: PHARMACIST

## 2017-07-10 PROCEDURE — 99499 UNLISTED E&M SERVICE: CPT | Mod: S$GLB,,, | Performed by: FAMILY MEDICINE

## 2017-07-10 PROCEDURE — 1126F AMNT PAIN NOTED NONE PRSNT: CPT | Mod: S$GLB,,, | Performed by: FAMILY MEDICINE

## 2017-07-10 PROCEDURE — 99214 OFFICE O/P EST MOD 30 MIN: CPT | Mod: S$GLB,,, | Performed by: FAMILY MEDICINE

## 2017-07-10 RX ORDER — PREDNISONE 5 MG/1
5 TABLET ORAL DAILY
Qty: 90 TABLET | Refills: 3 | Status: ON HOLD | OUTPATIENT
Start: 2017-07-10 | End: 2017-07-21

## 2017-07-10 NOTE — PROGRESS NOTES
INR in range.  Pt confirms dose.  Pt seen by pcp today & prednisone dose to 10mg x 1 week, then to drop to 5mg daily, f/u with Kuebel in 3 weeks.  Pt has had minimal/no DDI so adjusting only slightly for this week, then will resume.  Recheck in 2 weeks.  Pt will go to lab on this day as she has another appt here and no appts are available.

## 2017-07-10 NOTE — PROGRESS NOTES
"Subjective:       Patient ID: Rody Avalos is a 87 y.o. female.    Chief Complaint: Follow-up (3 mo f/u)    Follow-up pulmonary disease.  She is seeing Dr. Asher.  She was placed on prednisone 20 mg and has gradually decreased her dose.  The prednisone has helped with her dyspnea and energy level and sense of well-being.  She has been at 2.5 mg daily for the past 3 weeks and feels more short of breath and less able to do usual activities.  She does not have a cough and does not notice any wheezing.  She is not using any inhalers because they did not help in the past.      Review of Systems   Constitutional: Positive for fatigue. Negative for fever and unexpected weight change.   Respiratory: Positive for shortness of breath. Negative for cough.    Cardiovascular: Negative for chest pain, palpitations and leg swelling.        She has a history of atrial fibrillation and coronary disease but is having no symptoms.       Objective:     Blood pressure (!) 148/88, pulse 68, temperature 98.6 °F (37 °C), temperature source Oral, height 5' 2" (1.575 m), weight 61.4 kg (135 lb 4 oz).      Physical Exam   Constitutional: She appears well-developed and well-nourished. No distress.   Cardiovascular: Normal rate and regular rhythm.    Pulmonary/Chest: Effort normal and breath sounds normal. No respiratory distress. She has no wheezes. She has no rales.   Musculoskeletal: She exhibits no edema.       Assessment:       1. Pulmonary fibrosis    2. Coronary artery disease, angina presence unspecified, unspecified vessel or lesion type, unspecified whether native or transplanted heart    3. Essential hypertension        Plan:       1 week of 10 mg prednisone daily then reduced to 5 mg daily.  She will be seeing Dr. Asher in 3 weeks.     "

## 2017-07-18 ENCOUNTER — OFFICE VISIT (OUTPATIENT)
Dept: FAMILY MEDICINE | Facility: CLINIC | Age: 82
End: 2017-07-18
Payer: MEDICARE

## 2017-07-18 VITALS
WEIGHT: 136.44 LBS | HEART RATE: 96 BPM | DIASTOLIC BLOOD PRESSURE: 70 MMHG | SYSTOLIC BLOOD PRESSURE: 144 MMHG | OXYGEN SATURATION: 94 % | BODY MASS INDEX: 25.11 KG/M2 | HEIGHT: 62 IN | TEMPERATURE: 98 F

## 2017-07-18 DIAGNOSIS — K62.5 BRBPR (BRIGHT RED BLOOD PER RECTUM): Primary | ICD-10-CM

## 2017-07-18 PROCEDURE — 1159F MED LIST DOCD IN RCRD: CPT | Mod: S$GLB,,, | Performed by: NURSE PRACTITIONER

## 2017-07-18 PROCEDURE — 99999 PR PBB SHADOW E&M-EST. PATIENT-LVL IV: CPT | Mod: PBBFAC,,, | Performed by: NURSE PRACTITIONER

## 2017-07-18 PROCEDURE — 99213 OFFICE O/P EST LOW 20 MIN: CPT | Mod: S$GLB,,, | Performed by: NURSE PRACTITIONER

## 2017-07-18 PROCEDURE — 1126F AMNT PAIN NOTED NONE PRSNT: CPT | Mod: S$GLB,,, | Performed by: NURSE PRACTITIONER

## 2017-07-18 PROCEDURE — 99499 UNLISTED E&M SERVICE: CPT | Mod: S$GLB,,, | Performed by: NURSE PRACTITIONER

## 2017-07-18 NOTE — PROGRESS NOTES
Subjective:       Patient ID: Rody Avalos is a 87 y.o. female.    Chief Complaint: Hemorrhoids (bleeding over the weekend but not anymore)    HPI     Patient presents to clinic with complaints of BRBPR.   She noted blood on the toilet paper and in the water yesterday after defecation, has not noted any sx today.   Approximates about a teaspoon of blood loss.   She has been feeling constipated, which seems to have improved today.   Denies hematochezia, hematemesis.   She is maintained coumadin for Afib. She has previously experienced a GIB requiring hospitalization; did not require blood transfusion.     Review of Systems   Constitutional: Negative for chills and fever.   Respiratory: Positive for shortness of breath. Negative for cough and wheezing.    Cardiovascular: Negative for chest pain and palpitations.   Gastrointestinal: Positive for anal bleeding. Negative for abdominal pain, blood in stool, nausea and vomiting.   Skin: Negative for rash and wound.       Objective:      Physical Exam   Constitutional: She is oriented to person, place, and time. She appears well-developed and well-nourished.   HENT:   Head: Normocephalic and atraumatic.   Cardiovascular: Normal rate, regular rhythm and normal heart sounds.    No murmur heard.  Pulmonary/Chest: Effort normal and breath sounds normal. No respiratory distress. She has no wheezes. She has no rales.   Musculoskeletal: Normal range of motion. She exhibits no edema, tenderness or deformity.   Neurological: She is alert and oriented to person, place, and time. No cranial nerve deficit.   Skin: Skin is warm and dry.   Psychiatric: She has a normal mood and affect. Her behavior is normal.   Nursing note and vitals reviewed.      Assessment:       1. BRBPR (bright red blood per rectum)        Plan:   Rody Toledo was seen today for hemorrhoids.    Diagnoses and all orders for this visit:    BRBPR (bright red blood per rectum)  Recommended H&H and rectal exam,  both of which patient declined in clinic.   Increase dietary fiber. Red flags reviewed. Go to the ER with worsening of sx.

## 2017-07-20 PROBLEM — G45.9 TRANSIENT CEREBRAL ISCHEMIA: Status: ACTIVE | Noted: 2017-07-20

## 2017-07-20 RX ORDER — ATORVASTATIN CALCIUM 10 MG/1
TABLET, FILM COATED ORAL
Qty: 90 TABLET | Refills: 1 | Status: SHIPPED | OUTPATIENT
Start: 2017-07-20

## 2017-07-20 RX ORDER — METOPROLOL SUCCINATE 100 MG/1
TABLET, EXTENDED RELEASE ORAL
Qty: 90 TABLET | Refills: 2 | Status: SHIPPED | OUTPATIENT
Start: 2017-07-20

## 2017-07-21 PROBLEM — R09.02 HYPOXIA: Status: ACTIVE | Noted: 2017-07-21

## 2017-07-24 ENCOUNTER — TELEPHONE (OUTPATIENT)
Dept: FAMILY MEDICINE | Facility: CLINIC | Age: 82
End: 2017-07-24

## 2017-07-24 NOTE — TELEPHONE ENCOUNTER
Spoke with pt. Informed pt about earlier appt on 7-27-17 for 10 am. Pt stated that appt time was perfect.

## 2017-07-24 NOTE — TELEPHONE ENCOUNTER
----- Message from Malka Nguyen sent at 7/24/2017  9:10 AM CDT -----  Contact: srikanth  Patient was in the hospital at [facility] for a TIA. She was advised to get an appointment as soon as possible. I was only able to schedule on 08/02/ please call back with a sooner appointment. Not for today. Please call back [phone] (home)   Thanks!

## 2017-07-26 ENCOUNTER — TELEPHONE (OUTPATIENT)
Dept: FAMILY MEDICINE | Facility: CLINIC | Age: 82
End: 2017-07-26

## 2017-07-26 DIAGNOSIS — J84.10 PULMONARY FIBROSIS: ICD-10-CM

## 2017-07-26 DIAGNOSIS — I27.20 PULMONARY HYPERTENSION: Primary | ICD-10-CM

## 2017-07-26 NOTE — TELEPHONE ENCOUNTER
Pt has appt tomorrow for hospital follow up from TIA and was sent how with O2 and she is having trouble with using it. Niece would like Dr. Gaona to question pt because pt has been confused and was wondering if pt needs some sort of therapy.  Can pt get the small portable unit for O2 the other O2 tank is too big  Pt gets confused with right from left.

## 2017-07-26 NOTE — TELEPHONE ENCOUNTER
----- Message from Michelle Hickman sent at 7/26/2017  9:25 AM CDT -----  Contact: Belinda Grigsby [phone]  Patient niece Belinda Seb [phone], requesting a call from nurse in regards to patient condition that need to be discussed prior to her appt.

## 2017-07-27 ENCOUNTER — OFFICE VISIT (OUTPATIENT)
Dept: FAMILY MEDICINE | Facility: CLINIC | Age: 82
End: 2017-07-27
Payer: MEDICARE

## 2017-07-27 VITALS
DIASTOLIC BLOOD PRESSURE: 82 MMHG | WEIGHT: 140 LBS | TEMPERATURE: 98 F | BODY MASS INDEX: 24.8 KG/M2 | SYSTOLIC BLOOD PRESSURE: 118 MMHG | HEART RATE: 84 BPM | OXYGEN SATURATION: 93 % | HEIGHT: 63 IN

## 2017-07-27 DIAGNOSIS — I27.20 PULMONARY HYPERTENSION: Primary | ICD-10-CM

## 2017-07-27 DIAGNOSIS — R09.02 HYPOXEMIA: ICD-10-CM

## 2017-07-27 DIAGNOSIS — J84.10 PULMONARY FIBROSIS: ICD-10-CM

## 2017-07-27 DIAGNOSIS — Z86.73 HISTORY OF CVA (CEREBROVASCULAR ACCIDENT): ICD-10-CM

## 2017-07-27 PROCEDURE — 99999 PR PBB SHADOW E&M-EST. PATIENT-LVL III: CPT | Mod: PBBFAC,,, | Performed by: FAMILY MEDICINE

## 2017-07-27 PROCEDURE — 99499 UNLISTED E&M SERVICE: CPT | Mod: S$GLB,,, | Performed by: FAMILY MEDICINE

## 2017-07-27 PROCEDURE — 1126F AMNT PAIN NOTED NONE PRSNT: CPT | Mod: S$GLB,,, | Performed by: FAMILY MEDICINE

## 2017-07-27 PROCEDURE — 99214 OFFICE O/P EST MOD 30 MIN: CPT | Mod: S$GLB,,, | Performed by: FAMILY MEDICINE

## 2017-07-27 PROCEDURE — 1159F MED LIST DOCD IN RCRD: CPT | Mod: S$GLB,,, | Performed by: FAMILY MEDICINE

## 2017-07-27 NOTE — TELEPHONE ENCOUNTER
----- Message from Malou Jurado sent at 7/27/2017  1:40 PM CDT -----  I received a call from Ms. Rody Avalos.  She stated that you gave her a prescription for a small, portable unit for oxygen.  I checked in [employer] for the prescription, but I did not see one in the system.  Ms. Avalos is currently prescribed 4L via [place] continuous.  I do not carry anything that is small that I will be able to give her to use for her portability.  I don't know of any company that carries anything small for that dosage.  She seemed to think that she could just call us to deliver the equipment today.  I let her know that without seeing the prescription, I cannot deliver any equipment.  She asked that I let you know what I had told her.  If you have any questions, I can be reached at [phone].  Thank you, Malou

## 2017-07-27 NOTE — PROGRESS NOTES
"Subjective:       Patient ID: Rody Avalos is a 87 y.o. female.    Chief Complaint: Follow-up (from hospital (pain in eye while driving and disoriented) pt had a TIA need prescription for portable O2)    Follow-up hospitalization.  She had an episode of confusion and was taken to the emergency room and admitted.  She had an evaluation for possible TIA.  She was also suspected of being hypoxemic.  She had consultation with her pulmonologist, Dr. Asher.  He started her on oxygen.  She has an oxygen concentrator and would like to have a portable oxygen tank.  She has been using the oxygen round-the-clock.  She apparently desaturated lower than 88% overnight.  I was unclear if that was her home monitoring or if that was hospital monitoring.  She had brain imaging with no new findings related.  She was continued on Coumadin and aspirin 81 mg was added.  She continues on atorvastatin and losartan for stroke risk reduction      Review of Systems   Constitutional: Positive for fatigue. Negative for diaphoresis, fever and unexpected weight change.   Respiratory: Positive for shortness of breath (she gotvery winded walking from her car to the reception desk.). Negative for chest tightness and wheezing.    Cardiovascular: Negative for chest pain, palpitations and leg swelling.   Neurological:        She says that she is no longer confused.  She was able to prepare meals yesterday.  She is able to read with comprehension.  She is not having any balance problems.       Objective:     Blood pressure 118/82, pulse 84, temperature 97.8 °F (36.6 °C), temperature source Oral, height 5' 3" (1.6 m), weight 63.5 kg (139 lb 15.9 oz), SpO2 (!) 93 %.      Physical Exam   Constitutional: She appears well-developed and well-nourished. No distress.   Cardiovascular: Normal rate.    Irregular rhythm   Pulmonary/Chest: Effort normal.   Pulse ox 93% on room air.  This was a resting measurement.  Bilateral dry crackles worse on the right. "   Abdominal: There is no tenderness.   Musculoskeletal: She exhibits no edema.       Assessment:       1. Pulmonary hypertension    2. History of CVA (cerebrovascular accident)    3. Pulmonary fibrosis    4. Hypoxemia        Plan:       I ordered a portable oxygen.  She has a follow-up with pulmonary next week.  We discussed her Coumadin monitoring.  It is too difficult for her to drive to [place] today.  She will discuss monitoring options with the Coumadin clinic.

## 2017-07-31 ENCOUNTER — ANTI-COAG VISIT (OUTPATIENT)
Dept: CARDIOLOGY | Facility: CLINIC | Age: 82
End: 2017-07-31

## 2017-07-31 ENCOUNTER — LAB VISIT (OUTPATIENT)
Dept: LAB | Facility: HOSPITAL | Age: 82
End: 2017-07-31
Attending: FAMILY MEDICINE
Payer: MEDICARE

## 2017-07-31 ENCOUNTER — OFFICE VISIT (OUTPATIENT)
Dept: PODIATRY | Facility: CLINIC | Age: 82
End: 2017-07-31
Payer: MEDICARE

## 2017-07-31 VITALS — BODY MASS INDEX: 24.88 KG/M2 | HEIGHT: 63 IN | WEIGHT: 140.44 LBS

## 2017-07-31 DIAGNOSIS — Z79.01 LONG TERM (CURRENT) USE OF ANTICOAGULANTS: Primary | ICD-10-CM

## 2017-07-31 DIAGNOSIS — Z79.01 LONG TERM (CURRENT) USE OF ANTICOAGULANTS: ICD-10-CM

## 2017-07-31 DIAGNOSIS — G60.9 IDIOPATHIC PERIPHERAL NEUROPATHY: ICD-10-CM

## 2017-07-31 DIAGNOSIS — L84 CORN OR CALLUS: Primary | ICD-10-CM

## 2017-07-31 DIAGNOSIS — B35.1 ONYCHOMYCOSIS DUE TO DERMATOPHYTE: ICD-10-CM

## 2017-07-31 DIAGNOSIS — I63.9 CEREBROVASCULAR ACCIDENT (CVA), UNSPECIFIED MECHANISM: ICD-10-CM

## 2017-07-31 DIAGNOSIS — I48.0 PAF (PAROXYSMAL ATRIAL FIBRILLATION): ICD-10-CM

## 2017-07-31 LAB
INR PPP: 3.1
PROTHROMBIN TIME: 31 SEC

## 2017-07-31 PROCEDURE — 99999 PR PBB SHADOW E&M-EST. PATIENT-LVL III: CPT | Mod: PBBFAC,,, | Performed by: PODIATRIST

## 2017-07-31 PROCEDURE — 17999 UNLISTD PX SKN MUC MEMB SUBQ: CPT | Mod: CSM,,, | Performed by: PODIATRIST

## 2017-07-31 PROCEDURE — 99024 POSTOP FOLLOW-UP VISIT: CPT | Mod: CSM,,, | Performed by: PODIATRIST

## 2017-07-31 NOTE — PROGRESS NOTES
Left voice message with new dosing instructions and next INR date. Patient to return call and confirm understanding.

## 2017-07-31 NOTE — PROGRESS NOTES
Pt presents for routine non-covered foot care.  Pt. does have high risk feet and presents earlier than her routine exam.  Pedal pulses are palpable.  Nails are elongated, thickened bilaterally.  Diagnosis is onychomycosis and corns/calluses. Nails and calluses were reduced bilaterally.  Patient tolerated well and related relief. RTC plian. as PROC B.

## 2017-08-25 PROBLEM — I63.9 CEREBROVASCULAR ACCIDENT (CVA), UNSPECIFIED MECHANISM: Status: ACTIVE | Noted: 2017-08-25

## 2017-08-25 RX ORDER — WARFARIN SODIUM 5 MG/1
TABLET ORAL
Qty: 90 TABLET | Refills: 3 | Status: SHIPPED | OUTPATIENT
Start: 2017-08-25

## 2017-08-25 NOTE — PROGRESS NOTES
Pt states she is on oxygen full time and unable to come to clinic on Monday, she will go to [facility] in [place] can an order be put to be faxed over

## 2017-08-28 ENCOUNTER — ANTI-COAG VISIT (OUTPATIENT)
Dept: CARDIOLOGY | Facility: CLINIC | Age: 82
End: 2017-08-28

## 2017-08-28 ENCOUNTER — CLINICAL SUPPORT (OUTPATIENT)
Dept: CARDIOLOGY | Facility: CLINIC | Age: 82
End: 2017-08-28
Payer: MEDICARE

## 2017-08-28 DIAGNOSIS — I48.0 PAF (PAROXYSMAL ATRIAL FIBRILLATION): ICD-10-CM

## 2017-08-28 DIAGNOSIS — Z79.01 LONG TERM (CURRENT) USE OF ANTICOAGULANTS: ICD-10-CM

## 2017-08-28 DIAGNOSIS — Z95.818 STATUS POST PLACEMENT OF IMPLANTABLE LOOP RECORDER: ICD-10-CM

## 2017-08-28 PROCEDURE — 93297 REM INTERROG DEV EVAL ICPMS: CPT | Mod: S$GLB,,, | Performed by: INTERNAL MEDICINE

## 2017-08-28 PROCEDURE — 93299 LOOP RECORDER REMOTE: CPT | Mod: S$GLB,,, | Performed by: INTERNAL MEDICINE

## 2017-09-12 ENCOUNTER — TELEPHONE (OUTPATIENT)
Dept: FAMILY MEDICINE | Facility: CLINIC | Age: 82
End: 2017-09-12

## 2017-09-12 NOTE — TELEPHONE ENCOUNTER
Spoke w/ pt's niece, Belinda. She states pt spent some time in the city w/ family last week and was back on [place] Friday. She drove to see the hairdresser and did her regular daily activities. Belinda said that when Shan went down to eat dinner, he found pt on floor unconscious. He called her and then 911. She states pt had been feeling kind of yuck lately, and that Pul had recently ordered 24hr O2. Pt was taken to [facility] ED and since EMT was not sure how long pt had been on the floor, they were unable to admin TPA. Neuro came in, did all the tests and advised family to see if pt improves in the following few days. At that point pt was unable to swallow or talk, eyes locked to the side unable to sit up. Occasionally squeezes hand, but w/ tremor. Reacts to sound. As of yesterday pt has had no improvement and after thorough review of her will, DNR, etc, the doctor recommended a NH with hospice or an [facility]. Pt is going to be d/c'd to [facility] in [facility]. She wanted to let our team know this and was very grateful for the care pt has received over the years. She will keep us posted and said we can call her with any questions. She also asked that we send this info to Dr Arrieta, so his dept would be aware. All future appts have been cancelled.

## 2017-09-12 NOTE — TELEPHONE ENCOUNTER
WUI:    Spoke w/ pt's niece, Belinda. She states [place] some time [place] the [place]/ [place] [facility] [place] She drove to see the hairdresser and did her regular daily activities. Belinda said that when Shan went down to eat dinner, he found pt on floor unconscious. He called her and then 911. She states pt had been feeling kind of yuck lately, and that Pul had recently ordered 24hr O2. Pt was taken to [facility] ED and since EMT was not sure how long pt had been on the floor, they were unable to admin TPA. Neuro came in, did all the tests and advised family to see if pt improves in the following few days. At that point pt was unable to swallow or talk, eyes locked to the side unable to sit up. Occasionally squeezes hand, but w/ tremor. Reacts to sound. As of yesterday pt has had no improvement and after thorough review of her will, DNR, etc, the doctor recommended a NH with hospice or an [facility]. Pt is going to be d/c'd to [facility] in [facility]. She wanted to let our team know this and was very grateful for the care pt has received over the years. She will keep us posted and said we can call her with any questions. She also asked that we send this info to Dr Arrieta, so his dept would be aware. All future appts have been cancelled.

## 2017-09-12 NOTE — TELEPHONE ENCOUNTER
----- Message from Melida Perkins sent at 9/12/2017 12:50 PM CDT -----  Contact: Belinda Enciso - Niece  Patient suffered another stroke on Friday 09/09/17, niece are putting her in hospice care in [facility], contact Belinda at [phone] with any questions.     Thank you

## 2017-09-21 ENCOUNTER — TELEPHONE (OUTPATIENT)
Dept: FAMILY MEDICINE | Facility: CLINIC | Age: 82
End: 2017-09-21

## 2017-12-20 NOTE — PROGRESS NOTES
INR in range pt confirms dose and reports she did start eating vitk more routinely.  She sates she will not have carpal tunnel sx since the injection in hand, she feels sx not necessary.  No changes in meds.  Will continue the dose.  Pt has appt with another MD in 6 weeks and desires to have next INR done then as well.   No significant past surgical history

## 2020-10-12 NOTE — PROGRESS NOTES
Recent dx of pulmonary fibrosis, seen by Lakeshia.  She reports she has been started on steroid.  Prednisone 20mg daily x 10 days.  She will then start 10mg daily  She just started yesterday. No DDI at this point but it is too early to tell.  She has not had DDI with doxy in the past.  Will maintain dose and recheck at end of the week  
[facility]

## 2023-02-27 NOTE — PROGRESS NOTES
Subjective:      Patient ID: Rody Avalos is a 87 y.o. female.    Chief Complaint: Wound Care (left second toe)  Patient presents to clinic for a 1 week wound check.  Denies pain to the affected extremity with today's exam.  Has kept the previous clinic dressing clean and intact, however, relates the bandage became wet while bathing this morning.  Has been minimally ambulatory in the postoperative shoe.  Denies any additional pedal complaints.      Review of Systems   Constitution: Negative for chills, decreased appetite, diaphoresis and fever.   Cardiovascular: Negative for claudication and leg swelling.   Skin: Positive for color change, dry skin and nail changes.   Musculoskeletal: Positive for arthritis and joint pain. Negative for back pain and falls.   Neurological: Positive for numbness and paresthesias.   Psychiatric/Behavioral: Negative for altered mental status.           Objective:      Physical Exam   Constitutional: She is oriented to person, place, and time. She appears well-developed and well-nourished. No distress.   Cardiovascular:   Pulses:       Dorsalis pedis pulses are 2+ on the right side, and 2+ on the left side.        Posterior tibial pulses are 1+ on the right side, and 1+ on the left side.   CFT <3 seconds bilateral.  Pedal hair growth decreased bilateral.  Varicosities noted to bilateral lower extremity.  1+ pitting edema noted to bilateral lower extremity.  Toes are cool to touch bilateral.     Musculoskeletal: She exhibits edema. She exhibits no tenderness.   Muscle strength 5/5 in all muscle groups bilateral.  No tenderness nor crepitation with ROM of foot/ankle joints bilateral.  No pain with palpation of bilateral foot and ankle.  Bilateral pes cavus foot type.  Bilateral hallux abducto valgus.  Bilateral semi-rigid contracture of toes 2-5 with overlapping of the Lt.  2nd toe on the 1st.     Neurological: She is alert and oriented to person, place, and time. She has normal  strength. A sensory deficit is present.   Protective sensation per [place] monofilament decreased bilateral.    Vibratory sensation decreased bilateral.    Light touch intact bilateral.   Skin: Skin is warm, dry and intact. No abrasion, no bruising, no burn, no ecchymosis, no laceration, no lesion, no petechiae and no rash noted. She is not diaphoretic. No cyanosis or erythema. No pallor. Nails show no clubbing.   Pedal skin is thin and atrophic bilateral.  Toenails x 10 appear mycotic but well maintained.  Mature epithelium noted to the distal tip of the Lt. 2nd toe at the former ulceration site.               Assessment:       Encounter Diagnoses   Name Primary?    Healed ulcer of left foot on examination Yes    Idiopathic peripheral neuropathy          Plan:       Rody Toledo was seen today for wound care.    Diagnoses and all orders for this visit:    Healed ulcer of left foot on examination    Idiopathic peripheral neuropathy      I counseled the patient on her conditions, their implications and medical management.    Previous wound appears well healed with today's exam.    Fitted and dispensed a crest pad to offload the 2nd toe on the Lt.      Advised to continue wearing shoes that accommodate for digital deformities.    RTC in 6 weeks for routine care.    Stan Guan DPM         Detail Level: Generalized
